# Patient Record
Sex: MALE | Race: WHITE | Employment: FULL TIME | URBAN - METROPOLITAN AREA
[De-identification: names, ages, dates, MRNs, and addresses within clinical notes are randomized per-mention and may not be internally consistent; named-entity substitution may affect disease eponyms.]

---

## 2017-04-05 ENCOUNTER — OFFICE VISIT (OUTPATIENT)
Dept: FAMILY MEDICINE | Facility: CLINIC | Age: 32
End: 2017-04-05
Payer: COMMERCIAL

## 2017-04-05 VITALS
BODY MASS INDEX: 29.6 KG/M2 | HEART RATE: 68 BPM | HEIGHT: 70 IN | DIASTOLIC BLOOD PRESSURE: 82 MMHG | OXYGEN SATURATION: 98 % | TEMPERATURE: 98.1 F | WEIGHT: 206.8 LBS | SYSTOLIC BLOOD PRESSURE: 133 MMHG

## 2017-04-05 DIAGNOSIS — Z11.3 SCREENING EXAMINATION FOR VENEREAL DISEASE: Primary | ICD-10-CM

## 2017-04-05 DIAGNOSIS — K13.70 ABNORMAL MOUTH FINDING: ICD-10-CM

## 2017-04-05 DIAGNOSIS — G47.9 SLEEP DISTURBANCE: ICD-10-CM

## 2017-04-05 DIAGNOSIS — R06.83 SNORING: ICD-10-CM

## 2017-04-05 PROCEDURE — 87389 HIV-1 AG W/HIV-1&-2 AB AG IA: CPT | Performed by: PHYSICIAN ASSISTANT

## 2017-04-05 PROCEDURE — 86780 TREPONEMA PALLIDUM: CPT | Performed by: PHYSICIAN ASSISTANT

## 2017-04-05 PROCEDURE — 36415 COLL VENOUS BLD VENIPUNCTURE: CPT | Performed by: PHYSICIAN ASSISTANT

## 2017-04-05 PROCEDURE — 87491 CHLMYD TRACH DNA AMP PROBE: CPT | Performed by: PHYSICIAN ASSISTANT

## 2017-04-05 PROCEDURE — 86803 HEPATITIS C AB TEST: CPT | Performed by: PHYSICIAN ASSISTANT

## 2017-04-05 PROCEDURE — 99214 OFFICE O/P EST MOD 30 MIN: CPT | Performed by: PHYSICIAN ASSISTANT

## 2017-04-05 PROCEDURE — 87591 N.GONORRHOEAE DNA AMP PROB: CPT | Performed by: PHYSICIAN ASSISTANT

## 2017-04-05 ASSESSMENT — ANXIETY QUESTIONNAIRES
GAD7 TOTAL SCORE: 3
3. WORRYING TOO MUCH ABOUT DIFFERENT THINGS: SEVERAL DAYS
5. BEING SO RESTLESS THAT IT IS HARD TO SIT STILL: NOT AT ALL
7. FEELING AFRAID AS IF SOMETHING AWFUL MIGHT HAPPEN: NOT AT ALL
1. FEELING NERVOUS, ANXIOUS, OR ON EDGE: SEVERAL DAYS
IF YOU CHECKED OFF ANY PROBLEMS ON THIS QUESTIONNAIRE, HOW DIFFICULT HAVE THESE PROBLEMS MADE IT FOR YOU TO DO YOUR WORK, TAKE CARE OF THINGS AT HOME, OR GET ALONG WITH OTHER PEOPLE: SOMEWHAT DIFFICULT
6. BECOMING EASILY ANNOYED OR IRRITABLE: NOT AT ALL
2. NOT BEING ABLE TO STOP OR CONTROL WORRYING: SEVERAL DAYS

## 2017-04-05 ASSESSMENT — PATIENT HEALTH QUESTIONNAIRE - PHQ9: 5. POOR APPETITE OR OVEREATING: NOT AT ALL

## 2017-04-05 NOTE — PROGRESS NOTES
SUBJECTIVE:                                                    Aydin Jang is a 31 year old male who presents to clinic today for the following health issues:      Wants to be checked for STD's  Insomnia      Duration: 1 year plus    Description  Frequency of insomnia:  several times a week  Time to fall asleep: 10 minutes  Middle of night awakening:  occasionally  Early morning awakening:  Yes for work    Accompanying signs and symptoms:  snoring and tosses and turns    History  Similar episodes in past:  YES  Previous evaluation/sleep study:  no     Precipitating or alleviating factors:  New stressful situation: YES  Caffeine intake after lunchtime: no   OTC decongestants: no   Any new medications: no     Therapies tried and outcome: none     Depression or Anxiety - New Diagnosis   Duration of complaint: 5 months     Feels like his mood is because of his lack of sleep. He notes that he is likely over extending himself. He is staying up a lot later than he likely should.   Drinks coffee daily, 4 cups on average. He has been trying to restrict this to the weekends.   Has to get up at 4:30am-5am.   Recently going to bed 11pm-12am. He feels more awake when he goes to bed. Not very long to fall asleep but does ruminate over things sometimes.   He snores, no witnessed apnea. He does grind his teeth.     Ended up sleeping all day yesterday. Hard to sleep recently.   Finds himself thinking about things.   Has thought about therapy- would be willing to go. He notes that groups make him more anxious.   Has not been doing much exercise. Plans to start doing some running.     Problem list and histories reviewed & adjusted, as indicated.  Additional history: as documented    Labs reviewed in EPIC    Reviewed and updated as needed this visit by clinical staff  Tobacco  Allergies  Meds  Problems  Med Hx  Surg Hx  Fam Hx  Soc Hx        Reviewed and updated as needed this visit by Provider  Allergies  Meds   "Problems         ROS:  Constitutional, HEENT, cardiovascular, pulmonary, gi and gu systems are negative, except as otherwise noted.    OBJECTIVE:                                                    /87 (BP Location: Right arm, Patient Position: Chair, Cuff Size: Adult Regular)  Pulse 64  Temp 98.1  F (36.7  C) (Oral)  Ht 5' 9.5\" (1.765 m)  Wt 206 lb 12.8 oz (93.8 kg)  SpO2 98%  BMI 30.1 kg/m2  Body mass index is 30.1 kg/(m^2).  GENERAL: healthy, alert and no distress  PSYCH: mentation appears normal, affect normal/bright    Diagnostic Test Results:  none      ASSESSMENT/PLAN:                                                    1. Screening examination for venereal disease  - Hepatitis C antibody  - HIV Antigen Antibody Combo  - Anti Treponema  - Chlamydia trachomatis PCR  - Neisseria gonorrhoeae PCR    2. Snoring  Concerned about this causing some of his sleep problems. Referral.   - SLEEP EVALUATION & MANAGEMENT REFERRAL - ADULT; Future    3. Sleep disturbance  Discussed sleep hygiene and adjusting his schedule. Suspect counseling to work on some of the ruminating might be helpful.   - SLEEP EVALUATION & MANAGEMENT REFERRAL - ADULT; Future  - MENTAL HEALTH REFERRAL    4. Abnormal mouth finding  Patient is concerned about a possible opening from the soft palate to the nasal cavity and a sound that happens. Nothing seen on exam. Referral as needed.   - OTOLARYNGOLOGY REFERRAL    See Patient Instructions    Belia Pederson PA-C  [facility]  >50% of the visit spent in counseling and coordination of care. Visit length 25 minutes.    "

## 2017-04-05 NOTE — MR AVS SNAPSHOT
After Visit Summary   4/5/2017    Aydin Jang    MRN: [id number]           Patient Information     Date Of Birth          1985        Visit Information        Provider Department      4/5/2017 2:20 PM Belia Pederson PA-C [facility]        Today's Diagnoses     Screening examination for venereal disease    -  1    Snoring        Sleep disturbance        Abnormal mouth finding           Follow-ups after your visit        Additional Services     MENTAL HEALTH REFERRAL       Your provider has referred you to: [facility]: [facility] - Counseling (Individual/Couples/Family) - [facility] [phone]   [email or web address]   *Patient will be contacted by [facility]'s scheduling partner, [facility]), to schedule an appointment.  Patients may also call [facility] to schedule.    All scheduling is subject to the client's specific insurance plan & benefits, provider/location availability, and provider clinical specialities.  Please arrive 15 minutes early for your first appointment and bring your completed paperwork.    Please be aware that coverage of these services is subject to the terms and limitations of your health insurance plan.  Call member services at your health plan with any benefit or coverage questions.            OTOLARYNGOLOGY REFERRAL       Your provider has referred you to: [facility]: [facility] Jason Bonner [phone]   [email or web address]    Please be aware that coverage of these services is subject to the terms and limitations of your health insurance plan.  Call member services at your health plan with any benefit or coverage questions.      Please bring the following with you to your appointment:    (1) Any X-Rays, CTs or MRIs which have been performed.  Contact the facility where they were done to arrange for   prior to your scheduled appointment.   (2) List of current medications  (3) This referral request   (4) Any documents/labs given to you for this referral            SLEEP EVALUATION & MANAGEMENT REFERRAL - ADULT       Please be aware that coverage of these services is subject to the terms and limitations of your health insurance plan.  Call member services at your health plan with any benefit or coverage questions.      Please bring the following to your appointment:    >>   List of current medications   >>   This referral request   >>   Any documents/labs given to you for this referral    [facility] [phone] (Age 15 and up)                  Future tests that were ordered for you today     Open Future Orders        Priority Expected Expires Ordered    SLEEP EVALUATION & MANAGEMENT REFERRAL - ADULT Routine  4/5/2018 4/5/2017            Who to contact     If you have questions or need follow up information about today's clinic visit or your schedule please contact [facility] directly at [phone].  Normal or non-critical lab and imaging results will be communicated to you by MyChart, letter or phone within 4 business days after the clinic has received the results. If you do not hear from us within 7 days, please contact the clinic through [employer] or phone. If you have a critical or abnormal lab result, we will notify you by phone as soon as possible.  Submit refill requests through [employer] or call your pharmacy and they will forward the refill request to us. Please allow 3 business days for your refill to be completed.          Additional Information About Your Visit        [employer] Information     [employer] gives you secure access to your electronic health record. If you see a primary care provider, you can also send messages to your care team and make appointments. If you have questions, please call your primary care clinic.  If you do not have a primary care  "provider, please call [phone] and they will assist you.        Care EveryWhere ID     This is your Care EveryWhere ID. This could be used by other organizations to access your [facility] medical records  [id number]        Your Vitals Were     Pulse Temperature Height Pulse Oximetry BMI (Body Mass Index)       64 98.1  F (36.7  C) (Oral) 5' 9.5\" (1.765 m) 98% 30.1 kg/m2        Blood Pressure from Last 3 Encounters:   04/05/17 146/87   11/21/16 136/84   07/14/16 135/88    Weight from Last 3 Encounters:   04/05/17 206 lb 12.8 oz (93.8 kg)   11/21/16 207 lb (93.9 kg)   07/14/16 214 lb 12.8 oz (97.4 kg)              We Performed the Following     Anti Treponema     Chlamydia trachomatis PCR     Hepatitis C antibody     HIV Antigen Antibody Combo     MENTAL HEALTH REFERRAL     Neisseria gonorrhoeae PCR     OTOLARYNGOLOGY REFERRAL        Primary Care Provider    None Specified       No primary provider on file.        Thank you!     Thank you for choosing [facility]  for your care. Our goal is always to provide you with excellent care. Hearing back from our patients is one way we can continue to improve our services. Please take a few minutes to complete the written survey that you may receive in the mail after your visit with us. Thank you!             Your Updated Medication List - Protect others around you: Learn how to safely use, store and throw away your medicines at www.disposemymeds.org.          This list is accurate as of: 4/5/17  2:49 PM.  Always use your most recent med list.                   Brand Name Dispense Instructions for use    ascorbic acid 250 MG Chew chewable tablet    vitamin C     Take 250 mg by mouth as needed (3 times a week)         "

## 2017-04-05 NOTE — NURSING NOTE
"Chief Complaint   Patient presents with     STD     STD testing      Sleep Problem     wants a referral for a sleep study     Anxiety     wants a referral for a mental health provider        Initial /87 (BP Location: Right arm, Patient Position: Chair, Cuff Size: Adult Regular)  Pulse 64  Temp 98.1  F (36.7  C) (Oral)  Ht 5' 9.5\" (1.765 m)  Wt 206 lb 12.8 oz (93.8 kg)  SpO2 98%  BMI 30.1 kg/m2 Estimated body mass index is 30.1 kg/(m^2) as calculated from the following:    Height as of this encounter: 5' 9.5\" (1.765 m).    Weight as of this encounter: 206 lb 12.8 oz (93.8 kg).  Medication Reconciliation: complete   Renee Andersen ma      "

## 2017-04-06 LAB
C TRACH DNA SPEC QL NAA+PROBE: NORMAL
HCV AB SERPL QL IA: NORMAL
HIV 1+2 AB+HIV1 P24 AG SERPL QL IA: NORMAL
N GONORRHOEA DNA SPEC QL NAA+PROBE: NORMAL
SPECIMEN SOURCE: NORMAL
SPECIMEN SOURCE: NORMAL
T PALLIDUM IGG+IGM SER QL: NEGATIVE

## 2017-04-06 ASSESSMENT — PATIENT HEALTH QUESTIONNAIRE - PHQ9: SUM OF ALL RESPONSES TO PHQ QUESTIONS 1-9: 4

## 2017-04-06 ASSESSMENT — ANXIETY QUESTIONNAIRES: GAD7 TOTAL SCORE: 3

## 2017-04-19 ENCOUNTER — OFFICE VISIT (OUTPATIENT)
Dept: SLEEP MEDICINE | Facility: CLINIC | Age: 32
End: 2017-04-19
Attending: PHYSICIAN ASSISTANT
Payer: COMMERCIAL

## 2017-04-19 VITALS
BODY MASS INDEX: 29.63 KG/M2 | SYSTOLIC BLOOD PRESSURE: 131 MMHG | WEIGHT: 207 LBS | HEIGHT: 70 IN | OXYGEN SATURATION: 100 % | DIASTOLIC BLOOD PRESSURE: 86 MMHG | HEART RATE: 58 BPM

## 2017-04-19 DIAGNOSIS — G47.10 EXCESSIVE SLEEPINESS: ICD-10-CM

## 2017-04-19 DIAGNOSIS — F51.12 BEHAVIORALLY INDUCED INSUFFICIENT SLEEP SYNDROME: Primary | ICD-10-CM

## 2017-04-19 DIAGNOSIS — R06.83 SNORING: ICD-10-CM

## 2017-04-19 DIAGNOSIS — G47.9 SLEEP DISTURBANCE: ICD-10-CM

## 2017-04-19 PROCEDURE — 99244 OFF/OP CNSLTJ NEW/EST MOD 40: CPT | Performed by: INTERNAL MEDICINE

## 2017-04-19 NOTE — MR AVS SNAPSHOT
"              After Visit Summary   4/19/2017    Aydin Jang    MRN: [id number]           Patient Information     Date Of Birth          1985        Visit Information        Provider Department      4/19/2017 1:00 PM Parker Baird MD [facility]        Today's Diagnoses     Behaviorally induced insufficient sleep syndrome    -  1    Snoring        Sleep disturbance        Excessive sleepiness          Care Instructions    MY TREATMENT INFORMATION FOR SLEEP APNEA-  Aydin Jang    DOCTOR : Parker Baird  SLEEP CENTER :      MY CONTACT NUMBER:       If I haven't had a sleep study yet, what can I expect?  A personal story from [employer]  https://www.uConnect.com/watch?v=AxPLmlRpnCs    Am I having a home sleep study?  Here is a video in case you get home and want to make sure you have done it correctly  https://www.uConnect.com/watch?v=GER7P8iIss0&feature=youtu.be    Frequently asked questions:  1. What is Obstructive Sleep Apnea (RACHNA)? RACHNA is the most common type of sleep apnea. Apnea literally means, \"without breath.\" It is characterized by repetitive pauses in breathing, despite continued effort to breathe, and is usually associated with a reduction in blood oxygen saturation. Apneas can last 10 to over 60 seconds. It is caused by narrowing or collapse of the upper airway as muscles relax during sleep. Severity of sleep apnea is determined by frequency of breathing events and their effect on your sleep and oxygen levels determined during sleep testing.   2. What are the consequences of RACHNA? Symptoms include: daytime sleepiness- possibly increasing the risk of falling asleep while driving, unrefreshing/restless sleep, snoring, insomnia, waking frequently to urinate, waking with heartburn or reflux, reduced concentration and memory, and morning headaches. Other health consequences may include development of high blood pressure and other cardiovascular disease in persons who " are susceptible. Untreated RACHNA  can contribute to heart disease, stroke and diabetes.   3. What are the treatment options? In most situations, sleep apnea is a lifelong disease that must be managed with daily therapy. Medications are not effective for sleep apnea and surgery is generally not performed until other therapies have been tried. Therapy is usually tailored to the individual patient based on many factors including your wishes as well as severity of sleep apnea and severity of obesity. Continuous Positive Airway (CPAP) is the most reliable treatment. An oral device to hold your jaw forward is usually the next most reliable option. Other options include postioning devices (to keep you off your back), weight loss, and surgery including a tongue pacing device. There is more detail about some of these options below.            1. CPAP-  WHAT DOES IT DO AND HOW CAN I LEARN TO WEAR IT?                               BEFORE I START, CAN I WATCH A MOVIE TO GET A PLAN ON HOW TO USE CPAP?  https://www.[employer].com/watch?c=m6P47ev404Q      Continuous positive airway pressure, or CPAP, is the most effective treatment for obstructive sleep apnea. It works by blowing room air, through a mask, to hold your throat open. A decision to use CPAP is a major step forward in the pursuit of a healthier life. The successful use of CPAP will help you breathe easier, sleep better and live healthier. You can choose CPAP equipment from any durable medical equipment provider that meets your needs.  Using CPAP can be a positive experience if you keep these horn points in mind:  1. Commitment  CPAP is not a quick fix for your problem. It involves a long-term commitment to improve your sleep and your health.    2. Communication  Stay in close communication with both your sleep doctor and your CPAP supplier. Ask lots of questions and seek help when you need it.    3. Consistency  Use CPAP all night, every night and for every nap. You will  "receive the maximum health benefits from CPAP when you use it every time that you sleep. This will also make it easier for your body to adjust to the treatment.    4. Correction  The first machine and mask that you try may not be the best ones for you. Work with your sleep doctor and your CPAP supplier to make corrections to your equipment selection. Ask about trying a different type of machine or mask if you have ongoing problems. Make sure that your mask is a good fit and learn to use your equipment properly.    5. Challenge  Tell a family member or close friend to ask you each morning if you used your CPAP the previous night. Have someone to challenge you to give it your best effort.    6. Connection   Your adjustment to CPAP will be easier if you are able to connect with others who use the same treatment. Ask your sleep doctor if there is a support group in your area for people who have sleep apnea, or look for one on the Internet.  7. Comfort   Increase your level of comfort by using a saline spray, decongestant or heated humidifier if CPAP irritates your nose, mouth or throat. Use your unit's \"ramp\" setting to slowly get used to the air pressure level. There may be soft pads you can buy that will fit over your mask straps. Look on www.CPAP.com for accessories that can help make CPAP use more comfortable.  8. Cleaning   Clean your mask, tubing and headgear on a regular basis. Put this time in your schedule so that you don't forget to do it. Check and replace the filters for your CPAP unit and humidifier.    9. Completion   Although you are never finished with CPAP therapy, you should reward yourself by celebrating the completion of your first month of treatment. Expect this first month to be your hardest period of adjustment. It will involve some trial and error as you find the machine, mask and pressure settings that are right for you.    10. Continuation  After your first month of treatment, continue to make " a daily commitment to use your CPAP all night, every night and for every nap.    CPAP-Tips to starting with success:  Begin using your CPAP for short periods of time during the day while you watch TV or read.    Use CPAP every night and for every nap. Using it less often reduces the health benefits and makes it harder for your body to get used to it.    Make small adjustments to your mask, tubing, straps and headgear until you get the right fit. Tightening the mask may actually worsen the leak.  If it leaves significant marks on your face or irritates the bridge of your nose, it may not be the best mask for you.  Speak with the person who supplied the mask and consider trying other masks. Insurances will allow you to try different masks during the first month of starting CPAP.  Insurance also covers a new mask, hose and filter about every 6 months.    Use a saline nasal spray to ease mild nasal congestion. Neti-Pot or saline nasal rinses may also help. Nasal gel sprays can help reduce nasal dryness.  Biotene mouthwash can be helpful to protect your teeth if you experience frequent dry mouth.  Dry mouth may be a sign of air escaping out of your mouth or out of the mask in the case of a full face mask.  Speak with your provider if you expect that is the case.     Take a nasal decongestant to relieve more severe nasal or sinus congestion.  Do not use Afrin (oxymetazoline) nasal spray more than 3 days in a row.  Speak with your sleep doctor if your nasal congestion is chronic.    Use a heated humidifier that fits your CPAP model to enhance your breathing comfort. Adjust the heat setting up if you get a dry nose or throat, down if you get condensation in the hose or mask.  Position the CPAP lower than you so that any condensation in the hose drains back into the machine rather than towards the mask.    Try a system that uses nasal pillows if traditional masks give you problems.    Clean your mask, tubing and headgear  once a week. Make sure the equipment dries fully.    Regularly check and replace the filters for your CPAP unit and humidifier.    Work closely with your sleep provider and your CPAP supplier to make sure that you have the machine, mask and air pressure setting that works best for you. It is better to stop using it and call your provider to solve problems than to lay awake all night frustrated with the device.    BESIDES CPAP, WHAT OTHER THERAPIES ARE THERE?      Positioning Device  Positioning devices are generally used when sleep apnea is mild and only occurs on your back.This example shows a pillow that straps around the waist. It may be appropriate for those whose sleep study shows milder sleep apnea that occurs primarily when lying flat on one's back. Preliminary studies have shown benefit but effectiveness at home may need to be verified by a home sleep test. These devices are generally not covered by medical insurance.                      Oral Appliance  What is oral appliance therapy?  An oral appliance is a small acrylic device that fits over the upper and lower teeth or tongue (similar to an orthodontic retainer or a mouth guard). This device slightly advances the lower jaw or tongue, which moves the base of the tongue forward, opens the airway, improves breathing and can effectively treat snoring and obstructive sleep apnea sleep apnea. The appliance is fabricated and customized by a qualified dentist with experience in treating snoring and sleep apnea. Oral appliances are usually well tolerated and have relatively high compliance by patients1, 2, 3.  When is an oral appliance indicated?  Oral appliance therapy is recommended as a first-line treatment for patients with primary snoring, mild sleep apnea, and for patients with moderate sleep apnea who prefer appliance therapy to use of CPAP4, 5. Severity of sleep apnea is determined by sleep testing and is based on the number of respiratory events per hour  of sleep.   How successful is oral appliance therapy?  The success rate of oral appliance therapy in patients with mild sleep apnea is 75-80% while in patients with moderate sleep apnea it is 50-70%. The chance of success in patients with severe sleep apnea is 40-50%. The research also shows that oral appliances have a beneficial effect on the cardiovascular health of RACHNA patients at the same magnitude as CPAP therapy7.  Oral appliances should be a second-line treatment in cases of severe sleep apnea, but if not completely successful then a combination therapy utilizing CPAP plus oral appliance therapy may be effective. Oral appliances tend to be effective in a broad range of patients although studies show that the patients who have the highest success are females, younger patients, those with milder disease, and less severe obesity. 3, 6.   The chances of success are lower in patients who have more severe RACHNA, are older, and those who are morbidly obese.     Example of an oral appliance   Finding a dentist that practices dental sleep medicine  Specific training is available through the American Academy of Dental Sleep Medicine for dentists interested in working in the field of sleep. To find a dentist who is educated in the field of sleep and the use of oral appliances, near you, visit the Web site of the American Academy of Dental Sleep Medicine; also see   http://www.accpstorage.org/newOrganization/patients/oralAppliances.pdf  To search for a dentist certified in these practices:  Http://aadsm.org/FindADentist.aspx?1  1. Kaylin, et al. Objectively measured vs self-reported compliance during oral appliance therapy for sleep-disordered breathing. Chest 2013; 144(5): 3869-8750.  2. Odalys et al. Objective measurement of compliance during oral appliance therapy for sleep-disordered breathing. Thorax 2013; 68(1): 91-96.  3. Farhat et al. Mandibular advancement devices in 620 men and women with RACHNA and  snoring: tolerability and predictors of treatment success. Chest 2004; 125: 4542-0243.  4. Peter et al. Oral appliances for snoring and RACHNA: a review. Sleep 2006; 29: 244-262.  5. Mingo et al. Oral appliance treatment for RACHNA: an update. J Clin Sleep Med 2014; 10(2): 215-227.  6. Shen et al. Predictors of OSAH treatment outcome. J Dent Res 2007; 86: 6005-5834.      Weight Loss:    Weight management is a personal decision.  If you are interested in exploring weight loss strategies, the following discussion covers the impact on weight loss on sleep apnea and the approaches that may be successful.    Weight loss decreases severity of sleep apnea in most people with obesity. For those with mild obesity who have developed snoring with weight gain, even 15-30 pound weight loss can improve and occasionally eliminate sleep apnea.  Structured and life-long dietary and health habits are necessary to lose weight and keep healthier weight levels.     Though there may be significant health benefits from weight loss, long-term weight loss is very difficult to achieve- studies show success with dietary management in less than 10% of people. In addition, substantial weight loss may require years of dietary control and may be difficult if patients have severe obesity. In these cases, surgical management may be considered.  Finally, older individuals who have tolerated obesity without health complications may be less likely to benefit from weight loss strategies.    Your BMI is Body mass index is 30.13 kg/(m^2).  Body mass index (BMI) is one way to tell whether you are at a healthy weight, overweight, or obese. It measures your weight in relation to your height.  A BMI of 18.5 to 24.9 is in the healthy range. A person with a BMI of 25 to 29.9 is considered overweight, and someone with a BMI of 30 or greater is considered obese. More than two-thirds of American adults are considered overweight or obese.  Being overweight  or obese increases the risk for further weight gain. Excess weight may lead to heart disease and diabetes.  Creating and following plans for healthy eating and physical activity may help you improve your health.  Weight control is part of healthy lifestyle and includes exercise, emotional health, and healthy eating habits. Careful eating habits lifelong are the mainstay of weight control. Though there are significant health benefits from weight loss, long-term weight loss with diet alone may be very difficult to achieve- studies show long-term success with dietary management in less than 10% of people. Attaining a healthy weight may be especially difficult to achieve in those with severe obesity. In some cases, medications, devices and surgical management might be considered.  What can you do?  If you are overweight or obese and are interested in methods for weight loss, you should discuss this with your provider.     Consider reducing daily calorie intake by 500 calories.     Keep a food journal.     Avoiding skipping meals, consider cutting portions instead.    Diet combined with exercise helps maintain muscle while optimizing fat loss. Strength training is particularly important for building and maintaining muscle mass. Exercise helps reduce stress, increase energy, and improves fitness. Increasing exercise without diet control, however, may not burn enough calories to loose weight.       Start walking three days a week 10-20 minutes at a time    Work towards walking thirty minutes five days a week     Eventually, increase the speed of your walking for 1-2 minutes at time    In addition, we recommend that you review healthy lifestyles and methods for weight loss available through the National Institutes of Health patient information sites:  http://win.niddk.nih.gov/publications/index.htm    And look into health and wellness programs that may be available through your health insurance provider, employer, local  [facility], or [employer].    Weight management plan: Patient was referred to their PCP to discuss a diet and exercise plan.    Surgery:    Upper Airway Surgery for RACHNA  Surgery for RACHNA is a second-line treatment option in the management of sleep apnea.  Surgery should be considered for patients who are having a difficult time tolerating CPAP.    Surgery for RACHNA is directed at areas that are responsible for narrowing or complete obstruction of the airway during sleep.  There are a wide range of procedures available to enlarge and/or stabilize the airway to prevent blockage of breathing in the three major areas where it can occur: the palate, tongue, and nasal regions.  Successful surgical treatment depends on the accurate identification of the factors responsible for obstructive sleep apnea in each person.  A personalized approach is required because there is no single treatment that works well for everyone.  Because of anatomic variation, consultation with an examination by a sleep surgeon is a critical first step in determining what surgical options are best for each patient.  In some cases, examination during sedation may be recommended in order to guide the selection of procedures.  Patients will be counseled about risks and benefits as well as the typical recovery course after surgery. Surgery is typically not a cure for a person s RACHNA.  However, surgery will often significantly improve one s RACHNA severity (termed  success rate ).  Even in the absence of a cure, surgery will decrease the cardiovascular risk associated with OSA7; improve overall quality of life8 (sleepiness, functionality, sleep quality, etc).          Palate Procedures:  Patients with RACHNA often have narrowing of their airway in the region of their tonsils and uvula.  The goals of palate procedures are to widen the airway in this region as well as to help the tissues resist collapse.  Modern palate procedure techniques focus on tissue  conservation and soft tissue rearrangement, rather than tissue removal.  Often the uvula is preserved in this procedure. Residual sleep apnea is common in patient after pharyngoplasty with an average reduction in sleep apnea events of 33%2.      Tongue Procedures:  While patients are awake, the muscles that surround the throat are active and keep this region open for breathing. These muscles relax during sleep, allowing the tongue and other structures to collapse and block breathing.  There are several different tongue procedures available.  Selection of a tongue base procedure depends on characteristics seen on physical exam.  Generally, procedures are aimed at removing bulky tissues in this area or preventing the back of the tongue from falling back during sleep.  Success rates for tongue surgery range from 50-62%3.    Hypoglossal Nerve Stimulation:  Hypoglossal nerve stimulation has recently received approval from the [place] Food and Drug Administration for the treatment of obstructive sleep apnea.  This is based on research showing that the system was safe and effective in treating sleep apnea6.  Results showed that the median AHI score decreased 68%, from 29.3 to 9.0. This therapy uses an implant system that senses breathing patterns and delivers mild stimulation to airway muscles, which keeps the airway open during sleep.  The system consists of three fully implanted components: a small generator (similar in size to a pacemaker), a breathing sensor, and a stimulation lead.  Using a small handheld remote, a patient turns the therapy on before bed and off upon awakening.    Candidates for this device must be greater than 22 years of age, have moderate to severe RACHNA (AHI between 20-65), BMI less than 32, have tried CPAP/oral appliance without success, and have appropriate upper airway anatomy (determined by a sleep endoscopy performed by Dr. Wood).    Hypoglossal Nerve Stimulation Pathway:    The sleep  surgeon s office will work with the patient through the insurance prior-authorization process (including communications and appeals).    Nasal Procedures:  Nasal obstruction can interfere with nasal breathing during the day and night.  Studies have shown that relief of nasal obstruction can improve the ability of some patients to tolerate positive airway pressure therapy for obstructive sleep apnea1.  Treatment options include medications such as nasal saline, topical corticosteroid and antihistamine sprays, and oral medications such as antihistamines or decongestants. Non-surgical treatments can include external nasal dilators for selected patients. If these are not successful by themselves, surgery can improve the nasal airway either alone or in combination with these other options.      Combination Procedures:  Combination of surgical procedures and other treatments may be recommended, particularly if patients have more than one area of narrowing or persistent positional disease.  The success rate of combination surgery ranges from 66-80%2,3.      1. Jasmyn VEGA. The Role of the Nose in Snoring and Obstructive Sleep Apnoea: An Update.  Eur Arch Otorhinolaryngol. 2011; 268: 1365-73.  2.  Carly SM; Yaron JA; Kaila JR; Pallanch JF; Hansa MB; Johnathan SG; Bibi QUINTERO. Surgical modifications of the upper airway for obstructive sleep apnea in adults: a systematic review and meta-analysis. SLEEP 2010;33(10):3585-1309. Juan David WICK. Hypopharyngeal surgery in obstructive sleep apnea: an evidence-based medicine review.  Arch Otolaryngol Head Neck Surg. 2006 Feb;132(2):206-13.  3. Kamlesh YH1, Salima Y, Emir DESTINY. The efficacy of anatomically based multilevel surgery for obstructive sleep apnea. Otolaryngol Head Neck Surg. 2003 Oct;129(4):327-35.  4. Juan David WICK, Goldberg A. Hypopharyngeal Surgery in Obstructive Sleep Apnea: An Evidence-Based Medicine Review. Arch Otolaryngol Head Neck Surg. 2006 Feb;132(2):206-13.  5. Sahil CHATTERJEE  et al. Upper-Airway Stimulation for Obstructive Sleep Apnea.  N Engl J Med. 2014 Jan 9;370(2):139-49.  6. Jose Luis Y et al. Increased Incidence of Cardiovascular Disease in Middle-aged Men with Obstructive Sleep Apnea. Am J Respir Crit Care Med; 2002 166: 159-165  7. Nav MOSS et al. Studying Life Effects and Effectiveness of Palatopharyngoplasty (SLEEP) study: Subjective Outcomes of Isolated Uvulopalatopharyngoplasty. Otolaryngol Head Neck Surg. 2011; 144: 623-631.                      Follow-ups after your visit        Your next 10 appointments already scheduled     May 09, 2017  2:00 PM CDT   New Visit with David Geller [facility]    [facility]  [address]  [facility] [id number] [phone]              Future tests that were ordered for you today     Open Future Orders        Priority Expected Expires Ordered    Comprehensive Sleep Study Routine  10/16/2017 4/19/2017            Who to contact     If you have questions or need follow up information about today's clinic visit or your schedule please contact [facility] directly at [phone].  Normal or non-critical lab and imaging results will be communicated to you by MyChart, letter or phone within 4 business days after the clinic has received the results. If you do not hear from us within 7 days, please contact the clinic through [employer] or phone. If you have a critical or abnormal lab result, we will notify you by phone as soon as possible.  Submit refill requests through "[employer] or call your pharmacy and they will forward the refill request to us. Please allow 3 business days for your refill to be completed.          Additional Information About Your Visit        "[employer] Information     "[employer] gives you secure access to your electronic health record. If you see a primary care provider, you can also send messages to your care team and make appointments. If you  "have questions, please call your primary care clinic.  If you do not have a primary care provider, please call [phone] and they will assist you.        Care EveryWhere ID     This is your Care EveryWhere ID. This could be used by other organizations to access your [facility] medical records  [id number]        Your Vitals Were     Pulse Height Pulse Oximetry BMI (Body Mass Index)          58 1.765 m (5' 9.5\") 100% 30.13 kg/m2         Blood Pressure from Last 3 Encounters:   04/19/17 131/86   04/05/17 133/82   11/21/16 136/84    Weight from Last 3 Encounters:   04/19/17 93.9 kg (207 lb)   04/05/17 93.8 kg (206 lb 12.8 oz)   11/21/16 93.9 kg (207 lb)              We Performed the Following     SLEEP EVALUATION & MANAGEMENT REFERRAL - ADULT        Primary Care Provider Office Phone #    [facility] [phone]       [address] [place] [id number]        Thank you!     Thank you for choosing [facility]  for your care. Our goal is always to provide you with excellent care. Hearing back from our patients is one way we can continue to improve our services. Please take a few minutes to complete the written survey that you may receive in the mail after your visit with us. Thank you!             Your Updated Medication List - Protect others around you: Learn how to safely use, store and throw away your medicines at www.disposemymeds.org.          This list is accurate as of: 4/19/17  2:11 PM.  Always use your most recent med list.                   Brand Name Dispense Instructions for use    ascorbic acid 250 MG Chew chewable tablet    vitamin C     Take 250 mg by mouth as needed (3 times a week)         "

## 2017-04-19 NOTE — PATIENT INSTRUCTIONS
"MY TREATMENT INFORMATION FOR SLEEP APNEA-  Aydin Jang    DOCTOR : Parker Baird  SLEEP CENTER :      MY CONTACT NUMBER:       If I haven't had a sleep study yet, what can I expect?  A personal story from [place]  https://www.[employer].com/watch?v=AxPLmlRpnCs    Am I having a home sleep study?  Here is a video in case you get home and want to make sure you have done it correctly  https://www.[employer].com/watch?v=KOB2P9gRjd7&feature=youtu.be    Frequently asked questions:  1. What is Obstructive Sleep Apnea (RACHNA)? RACHNA is the most common type of sleep apnea. Apnea literally means, \"without breath.\" It is characterized by repetitive pauses in breathing, despite continued effort to breathe, and is usually associated with a reduction in blood oxygen saturation. Apneas can last 10 to over 60 seconds. It is caused by narrowing or collapse of the upper airway as muscles relax during sleep. Severity of sleep apnea is determined by frequency of breathing events and their effect on your sleep and oxygen levels determined during sleep testing.   2. What are the consequences of RACHNA? Symptoms include: daytime sleepiness- possibly increasing the risk of falling asleep while driving, unrefreshing/restless sleep, snoring, insomnia, waking frequently to urinate, waking with heartburn or reflux, reduced concentration and memory, and morning headaches. Other health consequences may include development of high blood pressure and other cardiovascular disease in persons who are susceptible. Untreated RACHNA  can contribute to heart disease, stroke and diabetes.   3. What are the treatment options? In most situations, sleep apnea is a lifelong disease that must be managed with daily therapy. Medications are not effective for sleep apnea and surgery is generally not performed until other therapies have been tried. Therapy is usually tailored to the individual patient based on many factors including your wishes as well as severity of sleep " apnea and severity of obesity. Continuous Positive Airway (CPAP) is the most reliable treatment. An oral device to hold your jaw forward is usually the next most reliable option. Other options include postioning devices (to keep you off your back), weight loss, and surgery including a tongue pacing device. There is more detail about some of these options below.            1. CPAP-  WHAT DOES IT DO AND HOW CAN I LEARN TO WEAR IT?                               BEFORE I START, CAN I WATCH A MOVIE TO GET A PLAN ON HOW TO USE CPAP?  https://www.[employer].com/watch?o=p2T46ax605C      Continuous positive airway pressure, or CPAP, is the most effective treatment for obstructive sleep apnea. It works by blowing room air, through a mask, to hold your throat open. A decision to use CPAP is a major step forward in the pursuit of a healthier life. The successful use of CPAP will help you breathe easier, sleep better and live healthier. You can choose CPAP equipment from any durable medical equipment provider that meets your needs.  Using CPAP can be a positive experience if you keep these horn points in mind:  1. Commitment  CPAP is not a quick fix for your problem. It involves a long-term commitment to improve your sleep and your health.    2. Communication  Stay in close communication with both your sleep doctor and your CPAP supplier. Ask lots of questions and seek help when you need it.    3. Consistency  Use CPAP all night, every night and for every nap. You will receive the maximum health benefits from CPAP when you use it every time that you sleep. This will also make it easier for your body to adjust to the treatment.    4. Correction  The first machine and mask that you try may not be the best ones for you. Work with your sleep doctor and your CPAP supplier to make corrections to your equipment selection. Ask about trying a different type of machine or mask if you have ongoing problems. Make sure that your mask is a good  "fit and learn to use your equipment properly.    5. Challenge  Tell a family member or close friend to ask you each morning if you used your CPAP the previous night. Have someone to challenge you to give it your best effort.    6. Connection   Your adjustment to CPAP will be easier if you are able to connect with others who use the same treatment. Ask your sleep doctor if there is a support group in your area for people who have sleep apnea, or look for one on the Internet.  7. Comfort   Increase your level of comfort by using a saline spray, decongestant or heated humidifier if CPAP irritates your nose, mouth or throat. Use your unit's \"ramp\" setting to slowly get used to the air pressure level. There may be soft pads you can buy that will fit over your mask straps. Look on www.CPAP.com for accessories that can help make CPAP use more comfortable.  8. Cleaning   Clean your mask, tubing and headgear on a regular basis. Put this time in your schedule so that you don't forget to do it. Check and replace the filters for your CPAP unit and humidifier.    9. Completion   Although you are never finished with CPAP therapy, you should reward yourself by celebrating the completion of your first month of treatment. Expect this first month to be your hardest period of adjustment. It will involve some trial and error as you find the machine, mask and pressure settings that are right for you.    10. Continuation  After your first month of treatment, continue to make a daily commitment to use your CPAP all night, every night and for every nap.    CPAP-Tips to starting with success:  Begin using your CPAP for short periods of time during the day while you watch TV or read.    Use CPAP every night and for every nap. Using it less often reduces the health benefits and makes it harder for your body to get used to it.    Make small adjustments to your mask, tubing, straps and headgear until you get the right fit. Tightening the mask " may actually worsen the leak.  If it leaves significant marks on your face or irritates the bridge of your nose, it may not be the best mask for you.  Speak with the person who supplied the mask and consider trying other masks. Insurances will allow you to try different masks during the first month of starting CPAP.  Insurance also covers a new mask, hose and filter about every 6 months.    Use a saline nasal spray to ease mild nasal congestion. Neti-Pot or saline nasal rinses may also help. Nasal gel sprays can help reduce nasal dryness.  Biotene mouthwash can be helpful to protect your teeth if you experience frequent dry mouth.  Dry mouth may be a sign of air escaping out of your mouth or out of the mask in the case of a full face mask.  Speak with your provider if you expect that is the case.     Take a nasal decongestant to relieve more severe nasal or sinus congestion.  Do not use Afrin (oxymetazoline) nasal spray more than 3 days in a row.  Speak with your sleep doctor if your nasal congestion is chronic.    Use a heated humidifier that fits your CPAP model to enhance your breathing comfort. Adjust the heat setting up if you get a dry nose or throat, down if you get condensation in the hose or mask.  Position the CPAP lower than you so that any condensation in the hose drains back into the machine rather than towards the mask.    Try a system that uses nasal pillows if traditional masks give you problems.    Clean your mask, tubing and headgear once a week. Make sure the equipment dries fully.    Regularly check and replace the filters for your CPAP unit and humidifier.    Work closely with your sleep provider and your CPAP supplier to make sure that you have the machine, mask and air pressure setting that works best for you. It is better to stop using it and call your provider to solve problems than to lay awake all night frustrated with the device.    BESIDES CPAP, WHAT OTHER THERAPIES ARE THERE?       Positioning Device  Positioning devices are generally used when sleep apnea is mild and only occurs on your back.This example shows a pillow that straps around the waist. It may be appropriate for those whose sleep study shows milder sleep apnea that occurs primarily when lying flat on one's back. Preliminary studies have shown benefit but effectiveness at home may need to be verified by a home sleep test. These devices are generally not covered by medical insurance.                      Oral Appliance  What is oral appliance therapy?  An oral appliance is a small acrylic device that fits over the upper and lower teeth or tongue (similar to an orthodontic retainer or a mouth guard). This device slightly advances the lower jaw or tongue, which moves the base of the tongue forward, opens the airway, improves breathing and can effectively treat snoring and obstructive sleep apnea sleep apnea. The appliance is fabricated and customized by a qualified dentist with experience in treating snoring and sleep apnea. Oral appliances are usually well tolerated and have relatively high compliance by patients1, 2, 3.  When is an oral appliance indicated?  Oral appliance therapy is recommended as a first-line treatment for patients with primary snoring, mild sleep apnea, and for patients with moderate sleep apnea who prefer appliance therapy to use of CPAP4, 5. Severity of sleep apnea is determined by sleep testing and is based on the number of respiratory events per hour of sleep.   How successful is oral appliance therapy?  The success rate of oral appliance therapy in patients with mild sleep apnea is 75-80% while in patients with moderate sleep apnea it is 50-70%. The chance of success in patients with severe sleep apnea is 40-50%. The research also shows that oral appliances have a beneficial effect on the cardiovascular health of RACHNA patients at the same magnitude as CPAP therapy7.  Oral appliances should be a second-line  treatment in cases of severe sleep apnea, but if not completely successful then a combination therapy utilizing CPAP plus oral appliance therapy may be effective. Oral appliances tend to be effective in a broad range of patients although studies show that the patients who have the highest success are females, younger patients, those with milder disease, and less severe obesity. 3, 6.   The chances of success are lower in patients who have more severe RACHNA, are older, and those who are morbidly obese.     Example of an oral appliance   Finding a dentist that practices dental sleep medicine  Specific training is available through the American Academy of Dental Sleep Medicine for dentists interested in working in the field of sleep. To find a dentist who is educated in the field of sleep and the use of oral appliances, near you, visit the Web site of the American Academy of Dental Sleep Medicine; also see   http://www.accpstorage.org/newOrganization/patients/oralAppliances.pdf  To search for a dentist certified in these practices:  Http://aadsm.org/FindADentist.aspx?1  1. Kaylin et al. Objectively measured vs self-reported compliance during oral appliance therapy for sleep-disordered breathing. Chest 2013; 144(5): 3316-6838.  2. Odalys, et al. Objective measurement of compliance during oral appliance therapy for sleep-disordered breathing. Thorax 2013; 68(1): 91-96.  3. Farhat et al. Mandibular advancement devices in 620 men and women with RACHNA and snoring: tolerability and predictors of treatment success. Chest 2004; 125: 3698-9524.  4. Green, et al. Oral appliances for snoring and RACHNA: a review. Sleep 2006; 29: 244-262.  5. Mingo, et al. Oral appliance treatment for RACHNA: an update. J Clin Sleep Med 2014; 10(2): 215-227.  6. Shen, et al. Predictors of OSAH treatment outcome. J Dent Res 2007; 86: 5967-5221.      Weight Loss:    Weight management is a personal decision.  If you are interested in  exploring weight loss strategies, the following discussion covers the impact on weight loss on sleep apnea and the approaches that may be successful.    Weight loss decreases severity of sleep apnea in most people with obesity. For those with mild obesity who have developed snoring with weight gain, even 15-30 pound weight loss can improve and occasionally eliminate sleep apnea.  Structured and life-long dietary and health habits are necessary to lose weight and keep healthier weight levels.     Though there may be significant health benefits from weight loss, long-term weight loss is very difficult to achieve- studies show success with dietary management in less than 10% of people. In addition, substantial weight loss may require years of dietary control and may be difficult if patients have severe obesity. In these cases, surgical management may be considered.  Finally, older individuals who have tolerated obesity without health complications may be less likely to benefit from weight loss strategies.    Your BMI is Body mass index is 30.13 kg/(m^2).  Body mass index (BMI) is one way to tell whether you are at a healthy weight, overweight, or obese. It measures your weight in relation to your height.  A BMI of 18.5 to 24.9 is in the healthy range. A person with a BMI of 25 to 29.9 is considered overweight, and someone with a BMI of 30 or greater is considered obese. More than two-thirds of American adults are considered overweight or obese.  Being overweight or obese increases the risk for further weight gain. Excess weight may lead to heart disease and diabetes.  Creating and following plans for healthy eating and physical activity may help you improve your health.  Weight control is part of healthy lifestyle and includes exercise, emotional health, and healthy eating habits. Careful eating habits lifelong are the mainstay of weight control. Though there are significant health benefits from weight loss, long-term  weight loss with diet alone may be very difficult to achieve- studies show long-term success with dietary management in less than 10% of people. Attaining a healthy weight may be especially difficult to achieve in those with severe obesity. In some cases, medications, devices and surgical management might be considered.  What can you do?  If you are overweight or obese and are interested in methods for weight loss, you should discuss this with your provider.     Consider reducing daily calorie intake by 500 calories.     Keep a food journal.     Avoiding skipping meals, consider cutting portions instead.    Diet combined with exercise helps maintain muscle while optimizing fat loss. Strength training is particularly important for building and maintaining muscle mass. Exercise helps reduce stress, increase energy, and improves fitness. Increasing exercise without diet control, however, may not burn enough calories to loose weight.       Start walking three days a week 10-20 minutes at a time    Work towards walking thirty minutes five days a week     Eventually, increase the speed of your walking for 1-2 minutes at time    In addition, we recommend that you review healthy lifestyles and methods for weight loss available through the National Institutes of Health patient information sites:  http://win.niddk.nih.gov/publications/index.htm    And look into health and wellness programs that may be available through your health insurance provider, employer, local community center, or laura club.    Weight management plan: Patient was referred to their PCP to discuss a diet and exercise plan.    Surgery:    Upper Airway Surgery for RACHNA  Surgery for RACHNA is a second-line treatment option in the management of sleep apnea.  Surgery should be considered for patients who are having a difficult time tolerating CPAP.    Surgery for RACHNA is directed at areas that are responsible for narrowing or complete obstruction of the airway  during sleep.  There are a wide range of procedures available to enlarge and/or stabilize the airway to prevent blockage of breathing in the three major areas where it can occur: the palate, tongue, and nasal regions.  Successful surgical treatment depends on the accurate identification of the factors responsible for obstructive sleep apnea in each person.  A personalized approach is required because there is no single treatment that works well for everyone.  Because of anatomic variation, consultation with an examination by a sleep surgeon is a critical first step in determining what surgical options are best for each patient.  In some cases, examination during sedation may be recommended in order to guide the selection of procedures.  Patients will be counseled about risks and benefits as well as the typical recovery course after surgery. Surgery is typically not a cure for a person s RACHNA.  However, surgery will often significantly improve one s RACHNA severity (termed  success rate ).  Even in the absence of a cure, surgery will decrease the cardiovascular risk associated with OSA7; improve overall quality of life8 (sleepiness, functionality, sleep quality, etc).          Palate Procedures:  Patients with RACHNA often have narrowing of their airway in the region of their tonsils and uvula.  The goals of palate procedures are to widen the airway in this region as well as to help the tissues resist collapse.  Modern palate procedure techniques focus on tissue conservation and soft tissue rearrangement, rather than tissue removal.  Often the uvula is preserved in this procedure. Residual sleep apnea is common in patient after pharyngoplasty with an average reduction in sleep apnea events of 33%2.      Tongue Procedures:  While patients are awake, the muscles that surround the throat are active and keep this region open for breathing. These muscles relax during sleep, allowing the tongue and other structures to collapse  and block breathing.  There are several different tongue procedures available.  Selection of a tongue base procedure depends on characteristics seen on physical exam.  Generally, procedures are aimed at removing bulky tissues in this area or preventing the back of the tongue from falling back during sleep.  Success rates for tongue surgery range from 50-62%3.    Hypoglossal Nerve Stimulation:  Hypoglossal nerve stimulation has recently received approval from the [place] Food and Drug Administration for the treatment of obstructive sleep apnea.  This is based on research showing that the system was safe and effective in treating sleep apnea6.  Results showed that the median AHI score decreased 68%, from 29.3 to 9.0. This therapy uses an implant system that senses breathing patterns and delivers mild stimulation to airway muscles, which keeps the airway open during sleep.  The system consists of three fully implanted components: a small generator (similar in size to a pacemaker), a breathing sensor, and a stimulation lead.  Using a small handheld remote, a patient turns the therapy on before bed and off upon awakening.    Candidates for this device must be greater than 22 years of age, have moderate to severe RACHNA (AHI between 20-65), BMI less than 32, have tried CPAP/oral appliance without success, and have appropriate upper airway anatomy (determined by a sleep endoscopy performed by Dr. Wood).    Hypoglossal Nerve Stimulation Pathway:    The sleep surgeon s office will work with the patient through the insurance prior-authorization process (including communications and appeals).    Nasal Procedures:  Nasal obstruction can interfere with nasal breathing during the day and night.  Studies have shown that relief of nasal obstruction can improve the ability of some patients to tolerate positive airway pressure therapy for obstructive sleep apnea1.  Treatment options include medications such as nasal saline,  topical corticosteroid and antihistamine sprays, and oral medications such as antihistamines or decongestants. Non-surgical treatments can include external nasal dilators for selected patients. If these are not successful by themselves, surgery can improve the nasal airway either alone or in combination with these other options.      Combination Procedures:  Combination of surgical procedures and other treatments may be recommended, particularly if patients have more than one area of narrowing or persistent positional disease.  The success rate of combination surgery ranges from 66-80%2,3.      1. Jasmyn VEGA. The Role of the Nose in Snoring and Obstructive Sleep Apnoea: An Update.  Eur Arch Otorhinolaryngol. 2011; 268: 1365-73.  2.  Carly SM; Yaron JA; Kaila JR; Pallanch JF; Hansa MB; Johnathan SG; Bibi QUINTERO. Surgical modifications of the upper airway for obstructive sleep apnea in adults: a systematic review and meta-analysis. SLEEP 2010;33(10):4812-2016. Juan David WICK. Hypopharyngeal surgery in obstructive sleep apnea: an evidence-based medicine review.  Arch Otolaryngol Head Neck Surg. 2006 Feb;132(2):206-13.  3. Kamlesh YH1, Salima Y, Emir DESTINY. The efficacy of anatomically based multilevel surgery for obstructive sleep apnea. Otolaryngol Head Neck Surg. 2003 Oct;129(4):327-35.  4. Juan David WICK, Goldberg A. Hypopharyngeal Surgery in Obstructive Sleep Apnea: An Evidence-Based Medicine Review. Arch Otolaryngol Head Neck Surg. 2006 Feb;132(2):206-13.  5. Sahil CHATTERJEE et al. Upper-Airway Stimulation for Obstructive Sleep Apnea.  N Engl J Med. 2014 Jan 9;370(2):139-49.  6. Petaj Y et al. Increased Incidence of Cardiovascular Disease in Middle-aged Men with Obstructive Sleep Apnea. Am J Respir Crit Care Med; 2002 166: 159-165  7. Nav MOSS et al. Studying Life Effects and Effectiveness of Palatopharyngoplasty (SLEEP) study: Subjective Outcomes of Isolated Uvulopalatopharyngoplasty. Otolaryngol Head Neck Surg. 2011; 144:  612-084.

## 2017-04-19 NOTE — NURSING NOTE
"Chief Complaint   Patient presents with     Consult     Discuss snoring, grinding teeth, and moves alot while sleeping.       Initial Ht 1.765 m (5' 9.5\")  Wt 93.9 kg (207 lb)  BMI 30.13 kg/m2 Estimated body mass index is 30.13 kg/(m^2) as calculated from the following:    Height as of this encounter: 1.765 m (5' 9.5\").    Weight as of this encounter: 93.9 kg (207 lb).  Medication Reconciliation: complete     Neck circumference: 16 inches.  40cm      HILTON Carbajal        "

## 2017-04-19 NOTE — PROGRESS NOTES
Sleep Consultation:    Date on this visit: 4/19/2017    Aydin Jang is sent by Belia Pederson for a sleep consultation regarding possible RACHNA.    Primary Physician: Clinic, [facility]     Aydin Jang is a 31 year old male presenting with complaints of snoring, tossing and turning, not getting enough sleep, and teeth grinding.  Girlfriend has encouraged him to get this all checked out.    He has no medical history.    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  Works 6:30 AM - 3 PM M - F although works from home on Thursdays.     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   Alarm is set for 4:30 or 5:30 depending on how tired he is.  May snooze for up to 30 minutes.     Doesn't typically nap as he feels worse.     On work nights usually trying to getting into bed between 10:30 and 11 PM but sometimes as late as 12:30 AM.  Finds sleep is somewhat restless according to sleep monitoring paul and Charge2 (HR and movement monitor).      Sleep Disordered Breathing - Snoring can be light to loud depending on the person and occasion.  No snort arousals or witnessed apneas.   No nocturia unless drinking.  No nocturnal GERD.    He denies morning headaches.  Does not usually get morning dry mouth.  No regular morning sinus congestion.   Patient was counseled on the importance of driving while alert, to pull over if drowsy, or nap before getting into the vehicle if sleepy.    Movement/Behaviors - No somniloquy.  No somnambulism.  No sleep related eating.  No dream enactment behavior.   Patient denies typical restless legs syndrome symptoms.     Bruxism noted with teeth wear per dentist.     Alcohol use - 5 - 6 nights per week will drink.  Typically having 2 - 3 drinks but may consume as many as 7 in social occasions.  Caffeine intake - 3 cups/day of coffee or  tea. Last caffeine intake is usually in the morning.  Tobacco exposure - Roommate smokes outside of home.  Illicit substances - None    Lives with 2 roommates and GF stays over on weekends.  Has no pets.     Does have family history of snoring in both brothers and Obstructive Sleep Apnea in father (does have CPAP but not sure if it is regularly used).    Allergies:    No Known Allergies    Medications:    Current Outpatient Prescriptions   Medication Sig Dispense Refill     ascorbic acid (VITAMIN C) 250 MG CHEW Take 250 mg by mouth as needed (3 times a week)          Problem List:  Patient Active Problem List    Diagnosis Date Noted     CARDIOVASCULAR SCREENING; LDL GOAL LESS THAN 160 10/31/2010     Priority: Medium        Past Medical/Surgical History:  Past Medical History:   Diagnosis Date     NO ACTIVE PROBLEMS      Past Surgical History:   Procedure Laterality Date     C NONSPECIFIC PROCEDURE  ? 1999    L wrist cyst removal     EYE SURGERY  07/15/16    PRK both eyes       Social History:  Social History     Social History     Marital status: Single     Spouse name: N/A     Number of children: N/A     Years of education: N/A     Occupational History     Not on file.     Social History Main Topics     Smoking status: Never Smoker     Smokeless tobacco: Not on file     Alcohol use Yes      Comment: 10 drinks a week      Drug use: No     Sexual activity: No     Other Topics Concern     Not on file     Social History Narrative       Family History:  Family History   Problem Relation Age of Onset     Obesity Father      GASTROINTESTINAL DISEASE Father      Lipids Father      DIABETES Maternal Grandmother      Hypertension Paternal Grandmother      Lipids Paternal Grandmother      Obesity Paternal Grandmother        Review of Systems:  A complete review of systems reviewed by me is negative with the exeption of what has been mentioned in the history of present illness.    Physical Examination:  Vitals: /86 (BP  "Location: Right arm, Cuff Size: Adult Regular)  Pulse 58  Ht 1.765 m (5' 9.5\")  Wt 93.9 kg (207 lb)  SpO2 100%  BMI 30.13 kg/m2  BMI= Body mass index is 30.13 kg/(m^2).    [place] Total Score 4/19/2017   Total score - [place] 2     General appearance: No apparent distress, well groomed.    HEENT:   Head: Normocephalic, atraumatic.  Eyes: PERRL  Nose: Nares widely patent.  No exudate.  No septal deviation noted.  Mouth: Teeth: Normal               Tongue: Normal  Oropharynx:  Mallampati Classification: I    Tonsils: Grade 1    Uvula: Normal    Neck: Supple without bruit.     Neck Cir (cm): 40 cm (4/19/2017 12:38 PM)    Cardiac: Regular rate and rhythm.  No murmurs.  Radial pulses are strong and symmetric.  Pulmonary: Symmetric air movement, lungs clear to auscultation bilaterally.  Musculoskeletal: No edema noted.  No clubbing or cyanosis.  Skin: Warm, dry, intact.  Neurologic: Alert and oriented to person, place and time   Gait is normal.  Psychiatric: Affect pleasant.  Mood good.    Impression/Plan:  1. Snoring  I have a suspicion for RACHNA based on presence of snoring and excessive daytime sleepiness. We discussed pathophysiology of obstructive sleep apnea, testing with overnight polysomnography, and treatment modalities (CPAP only discussed at this visit). We discussed consequences of untreated RACHNA. Patient is interested in treatment and willing to undergo overnight sleep testing.   We also discussed effects of alcohol consumption on airway patency.    Home sleep testing is inappropriate for this patient due to lack of high pre-test probability increasing chances that Home Sleep Apnea Testing will provide inaccurate results.  StopBang Total Score: 3 (4/19/2017  1:00 PM)    Study has been ordered today.    - SLEEP EVALUATION & MANAGEMENT REFERRAL - ADULT  - Comprehensive Sleep Study; Future    2. Sleep disturbance  Requested referral due to sleep disturbance likely due to #1 and #3  - SLEEP EVALUATION & " MANAGEMENT REFERRAL - ADULT    3. Behaviorally induced insufficient sleep syndrome  Counseled on the effects of insufficient sleep and long-term health consequences.  Encouraged to target more sleep intake as a health goal.    4. Excessive sleepiness  Likely related to #3 and to a lesser extent #1.  - Comprehensive Sleep Study; Future    Literature provided regarding sleep apnea.      He will follow up with me in approximately two weeks after his sleep study has been competed to review the results and discuss plan of care.       Polysomnography reviewed.  Obstructive sleep apnea reviewed.  Complications of untreated sleep apnea were reviewed.    Parker Baird MD, Sleep Physician  Apr 19, 2017     CC: Belia Pederson

## 2017-05-09 ENCOUNTER — OFFICE VISIT (OUTPATIENT)
Dept: PSYCHOLOGY | Facility: CLINIC | Age: 32
End: 2017-05-09
Attending: PHYSICIAN ASSISTANT
Payer: COMMERCIAL

## 2017-05-09 DIAGNOSIS — F41.9 ANXIETY: Primary | ICD-10-CM

## 2017-05-09 PROCEDURE — 90834 PSYTX W PT 45 MINUTES: CPT | Performed by: COUNSELOR

## 2017-05-09 ASSESSMENT — ANXIETY QUESTIONNAIRES
3. WORRYING TOO MUCH ABOUT DIFFERENT THINGS: NOT AT ALL
5. BEING SO RESTLESS THAT IT IS HARD TO SIT STILL: NOT AT ALL
6. BECOMING EASILY ANNOYED OR IRRITABLE: NOT AT ALL
7. FEELING AFRAID AS IF SOMETHING AWFUL MIGHT HAPPEN: NOT AT ALL
1. FEELING NERVOUS, ANXIOUS, OR ON EDGE: NOT AT ALL
GAD7 TOTAL SCORE: 0
IF YOU CHECKED OFF ANY PROBLEMS ON THIS QUESTIONNAIRE, HOW DIFFICULT HAVE THESE PROBLEMS MADE IT FOR YOU TO DO YOUR WORK, TAKE CARE OF THINGS AT HOME, OR GET ALONG WITH OTHER PEOPLE: NOT DIFFICULT AT ALL
2. NOT BEING ABLE TO STOP OR CONTROL WORRYING: NOT AT ALL

## 2017-05-09 ASSESSMENT — PATIENT HEALTH QUESTIONNAIRE - PHQ9: 5. POOR APPETITE OR OVEREATING: NOT AT ALL

## 2017-05-09 NOTE — PROGRESS NOTES
"               Progress Note - Initial Session    Client Name:  Aydin Jang Date: 5/9/2017         Service Type: Individual      Session Start Time: 2:00p  Session End Time: 2:45p      Session Length: 38 - 52      Session #: 1     Attendees: Client attended alone         Diagnostic Assessment in progress.  Unable to complete documentation at the conclusion of the first session due to assessing and addressing mild anxiety symptoms and stressors in the past month that led to a \"nervous breakdown\".       Mental Status Assessment:  Appearance:   Appropriate   Eye Contact:   Good   Psychomotor Behavior: Normal    Attitude:   Cooperative   Orientation:   All  Speech   Rate / Production: Hyperverbal  Normal    Volume:  Normal   Mood:    Somewhat anxious  Normal  Affect:    Appropriate   Thought Content:  Clear   Thought Form:  Coherent  Circumstantial  Insight:    Fair       Safety Issues and Plan for Safety and Risk Management:  Client denies current fears or concerns for personal safety.  Client denies current or recent suicidal ideation or behaviors.  Client denies current or recent homicidal ideation or behaviors.  Client denies current or recent self injurious behavior or ideation.  Client denies other safety concerns.  A safety and risk management plan has not been developed at this time, however client was given the after-hours number / 911 should there be a change in any of these risk factors.  Client reports there are no firearms in the house.      Diagnostic Criteria:   - Anxiety and worry about a number of events or activities (such as work or school performance).    - The person finds it difficult to control the worry.   - Difficulty concentrating or mind going blank.    - Sleep disturbance (difficulty falling or staying asleep, or restless unsatisfying sleep).       DSM5 Diagnoses: (Sustained by DSM5 Criteria Listed Above)  Diagnoses: 300.00 (F41.9) Unspecified Anxiety Disorder; RULE .23 (F40.10) " Social Anxiety Disorder  Psychosocial & Contextual Factors: Work stress, relationship stress  WHODAS 2.0 (12 item)            This questionnaire asks about difficulties due to health conditions. Health conditions  include  disease or illnesses, other health problems that may be short or long lasting,  injuries, mental health or emotional problems, and problems with alcohol or drugs.                     Think back over the past 30 days and answer these questions, thinking about how much  difficulty you had doing the following activities. For each question, please [place] only  one response.    S1 Standing for long periods such as 30 minutes? None =         1   S2 Taking care of household responsibilities? None =         1   S3 Learning a new task, for example, learning how to get to a new place? None =         1   S4 How much of a problem do you have joining community activities (for example, festivals, Jain or other activities) in the same way as anyone else can? None =         1   S5 How much have you been emotionally affected by your health problems? None =         1     In the past 30 days, how much difficulty did you have in:   S6 Concentrating on doing something for ten minutes? None =         1   S7 Walking a long distance such as a kilometer (or equivalent)? None =         1   S8 Washing your whole body? None =         1   S9 Getting dressed? None =         1   S10 Dealing with people you do not know? None =         1   S11 Maintaining a friendship? None =         1   S12 Your day to day work? None =         1     H1 Overall, in the past 30 days, how many days were these difficulties present? Record number of days 0   H2 In the past 30 days, for how many days were you totally unable to carry out your usual activities or work because of any health condition? Record number of days 0   H3 In the past 30 days, not counting the days that you were totally unable, for how many days did you cut back or reduce  your usual activities or work because of any health condition? Record number of days 0       Collateral Reports Completed:  Routed note to PCP      PLAN: (Homework, other):  Client stated that he may follow up for ongoing services with [facility].  Continue to assess and address anxiety symptoms and ongoing stressors, complete DA, and start treatment plan.       David Geller [facility]

## 2017-05-09 NOTE — MR AVS SNAPSHOT
MRN:[id number]                      After Visit Summary   5/9/2017    Aydin Jang    MRN: [id number]           Visit Information        Provider Department      5/9/2017 2:00 PM [facility]      Your next 10 appointments already scheduled     May 22, 2017 11:00 AM CDT   Return Visit with David Geller [facility] ([facility])    [facility]  [address]  [facility] [id number] [phone]              MyChart Information     [employer] gives you secure access to your electronic health record. If you see a primary care provider, you can also send messages to your care team and make appointments. If you have questions, please call your primary care clinic.  If you do not have a primary care provider, please call [phone] and they will assist you.        Care EveryWhere ID     This is your Care EveryWhere ID. This could be used by other organizations to access your [facility] medical records  [id number]

## 2017-05-09 NOTE — Clinical Note
Hi, Client completed first intake appt for therapy. He currently meets criteria for unspecified anxiety. He reported significant improvement in sleep and anxiety in the past month due to stressors being resolved. He seemed ambivalent about continuing therapy, but was willing to schedule a follow up appt to complete intake. Please let me know if you have any questions or concerns.  Thank you, David Geller MA, [facility]

## 2017-05-10 ASSESSMENT — PATIENT HEALTH QUESTIONNAIRE - PHQ9: SUM OF ALL RESPONSES TO PHQ QUESTIONS 1-9: 0

## 2017-05-10 ASSESSMENT — ANXIETY QUESTIONNAIRES: GAD7 TOTAL SCORE: 0

## 2017-05-22 ENCOUNTER — OFFICE VISIT (OUTPATIENT)
Dept: PSYCHOLOGY | Facility: CLINIC | Age: 32
End: 2017-05-22
Payer: COMMERCIAL

## 2017-05-22 ENCOUNTER — FCC EXTENDED DOCUMENTATION (OUTPATIENT)
Dept: PSYCHOLOGY | Facility: CLINIC | Age: 32
End: 2017-05-22

## 2017-05-22 DIAGNOSIS — F41.9 ANXIETY: Primary | ICD-10-CM

## 2017-05-22 PROCEDURE — 90791 PSYCH DIAGNOSTIC EVALUATION: CPT | Performed by: COUNSELOR

## 2017-05-22 NOTE — PROGRESS NOTES
"                                                                                                                                                                      Adult Intake Structured Interview  Standard Diagnostic Assessment      CLIENT'S NAME: Aydin Jang  MRN:   [id number]  :   1985  ACCT. NUMBER: [id number]  DATE OF SERVICE: 17      Identifying Information:  Client is a 31 year old, , partnered / significant other male. Client was referred for counseling by Belia Pederson at [facility]. Client is currently employed full time. Client attended the session alone.       Client's Statement of Presenting Concern:  Client reports the reason for seeking therapy at this time as \"stress and sleep deprivation induced nervous breakdown\" and some social anxiety.  Client stated that his symptoms have resulted in the following functional impairments: management of the household and or completion of tasks, relationship(s), self-care, social interactions and work / vocational responsibilities.      History of Presenting Concern:  Client reports that these problem(s) began 1-2 months ago when client was experiencing multiple stressors and could not sleep: buying a house for his parents, adapting to new relationship, and working 2 jobs. Reported not feeling like himself and had to take a day off from work. Client has attempted to resolve these concerns in the past through better balance of time, talking with girlfriend, and better sleep habits. For social anxiety, client reported feeling nervous in large crowds and reported the following ways to manage: avoiding, leaving situations without saying anything to friends, stopping by when events are slower, and going places with a group of friends. Client reports that other professional(s) are not involved in providing support / services.       Social History:  Client reported he grew up in [place], [place]. They were the " first born of 4 children. This is an intact family and parents remain . Client reported that his childhood was good and typical. Described himself to be free flowing and to not have have one strong social support network. Identified being an odd ball and nerdy due to his interest in computer. Recalled some experiences of bullying, but reported not being impacted by it and being able to dismiss negative comments. Client described his current relationships with family of origin as fine with father (said father is short tempered, but they share similar hobbies, great with mom (can talk about personal things with her), closest with sister, and some strain with 2 younger brothers (butting heads, some jealously, and stubbornness from brothers; youngest brother has very consersative views).    Client reported a history of 1 committed relationship - reported girlfriend identifies as gender queer and is very involved with the [facility] community. Client has been partnered / significant other for 6 months (longest lasting relationship). Client reported having 0 children. Client identified some stable and meaningful social connections. Client reported that he has not been involved with the legal system. Client's highest education level was college graduate. Client did not identify any learning problems. There are no ethnic, cultural or Jew factors that may be relevant for therapy. Client identified his preferred language to be English. Client reported he does not need the assistance of an  or other support involved in therapy. Modifications will not be used to assist communication in therapy. Client did not serve in the .     Client reports family history includes DIABETES in his maternal grandmother; GASTROINTESTINAL DISEASE in his father; Hypertension in his paternal grandmother; Lipids in his father and paternal grandmother; Obesity in his father and paternal grandmother.      Mental Health  History:  Client reported no family history of mental health issues.  Client has not been previously diagnosed with a mental health diagnosis.  Client has not received mental health services in the past.  Hospitalizations: None.  Client is not currently receiving any mental health services.      Chemical Health History:  Client reported the following biological family members or relatives with chemical health issues: Brothers reportedly uses alcohol and had DWI. Client has not received chemical dependency treatment in the past. Client is not currently receiving any chemical dependency treatment. Client reported the following problems as a result of drinking: drinking while drunk, but no accident or DWI.      Client Reports:  Client reports using alcohol 2-6 times per week and has 1-4 drinks at a time. Client first started drinking in college.  Client denies using tobacco.  Client denies using marijuana.  Client reports using caffeine 4-7 times per week and drinks 2.5 cups at a time. Client started using caffeine in college.  Client denies using street drugs.  Client denies the non-medical use of prescription or over the counter drugs.    CAGE: None of the patient's responses to the CAGE screening were positive / Negative CAGE score   Based on the negative Cage-Aid score and clinical interview there are not indications of drug or alcohol abuse.    Discussed the general effects of drugs and alcohol on health and well-being. Therapist gave client printed information about the effects of chemical use on his health and well being.      Significant Losses / Trauma / Abuse / Neglect Issues:  There are indications or report of significant loss, trauma, abuse or neglect issues related to: death of grandmother, grandfather, cousin, and best friend from college's mother.    Issues of possible neglect are not present.      Medical Issues:  Client has not had a physical exam to rule out medical causes for current symptoms. Date  of last physical exam was within the past year. Client was encouraged to follow up with PCP if symptoms were to develop. The client does not have a Primary Care Provider and was encouraged to establish care with a PCP. The client reports not having a psychiatrist. Client reports no current medical concerns. The client denies the presence of chronic or episodic pain. There are not significant nutritional concerns.     Client reports current meds as:   Current Outpatient Prescriptions   Medication Sig     ascorbic acid (VITAMIN C) 250 MG CHEW Take 250 mg by mouth as needed (3 times a week)      No current facility-administered medications for this visit.        Client Allergies:  No Known Allergies      Medical History:  Past Medical History:   Diagnosis Date     NO ACTIVE PROBLEMS        Medication Adherence:  Client reports taking prescribed medications as prescribed.    Client was provided recommendation to follow-up with prescribing physician.      Mental Status Assessment:  Appearance:   Appropriate   Eye Contact:   Good   Psychomotor Behavior: Normal   Attitude:   Cooperative   Orientation:   All  Speech   Rate / Production: Hyperverbal  Normal    Volume:  Normal   Mood:    Normal  Affect:    Appropriate  Bright   Thought Content:  Clear   Thought Form:  Coherent  Tangential  Circumstantial  Insight:    Good       Review of Symptoms:  Depression: No symptoms  Cici:  No symptoms  Psychosis: No symptoms  Anxiety: Nervousness  Panic:  No symptoms  Post Traumatic Stress Disorder: No symptoms  Obsessive Compulsive Disorder: No symptoms  Eating Disorder: No symptoms  Oppositional Defiant Disorder: No symptoms  ADD / ADHD: No symptoms  Conduct Disorder: No symptoms      Safety Issues and Plan for Safety and Risk Management:  Client denies a history of suicidal ideation, suicide attempts, self-injurious behavior, homicidal ideation, homicidal behavior and and other safety concerns  Client denies current fears or  concerns for personal safety.  Client denies current or recent suicidal ideation or behaviors.  Client denies current or recent homicidal ideation or behaviors.  Client denies current or recent self injurious behavior or ideation.  Client denies other safety concerns.  Client reports there are no firearms in the house.  A safety and risk management plan has not been developed at this time, however client was given the after-hours number / 911 should there be a change in any of these risk factors.      Client's Strengths and Limitations:  Client identified the following strengths or resources that will help him succeed in counseling: intelligence. Client identified the following supports: girlfriend and friends. Things that may interfere with the clients success in counseling include: lack of commitment and time constraints.      Diagnostic Criteria:  - Anxiety and worry about a number of events or activities (such as work or school performance).   - The person finds it difficult to control the worry.  - Difficulty concentrating or mind going blank.   - Sleep disturbance (difficulty falling or staying asleep, or restless unsatisfying sleep).       Functional Status:  Client's symptoms have caused reduced functional status in the following areas: Activities of Daily Living, Occupational / Vocational, and Social / Relational.      DSM5 Diagnoses: (Sustained by DSM5 Criteria Listed Above)  Diagnoses: 300.00 (F41.9) Unspecified Anxiety Disorder; RULE .23 (F40.10) Social Anxiety Disorder  Psychosocial & Contextual Factors: Work stress, some adjustments in new relationship, some strained family relationships  WHODAS 2.0 (12 item)            This questionnaire asks about difficulties due to health conditions. Health conditions  include  disease or illnesses, other health problems that may be short or long lasting,  injuries, mental health or emotional problems, and problems with alcohol or drugs.                      Think back over the past 30 days and answer these questions, thinking about how much  difficulty you had doing the following activities. For each question, please Hamilton only  one response.    S1 Standing for long periods such as 30 minutes? None =         1   S2 Taking care of household responsibilities? None =         1   S3 Learning a new task, for example, learning how to get to a new place? None =         1   S4 How much of a problem do you have joining community activities (for example, festivals, Congregational or other activities) in the same way as anyone else can? None =         1   S5 How much have you been emotionally affected by your health problems? None =         1     In the past 30 days, how much difficulty did you have in:   S6 Concentrating on doing something for ten minutes? None =         1   S7 Walking a long distance such as a kilometer (or equivalent)? None =         1   S8 Washing your whole body? None =         1   S9 Getting dressed? None =         1   S10 Dealing with people you do not know? None =         1   S11 Maintaining a friendship? None =         1   S12 Your day to day work? None =         1     H1 Overall, in the past 30 days, how many days were these difficulties present? Record number of days 0   H2 In the past 30 days, for how many days were you totally unable to carry out your usual activities or work because of any health condition? Record number of days 0   H3 In the past 30 days, not counting the days that you were totally unable, for how many days did you cut back or reduce your usual activities or work because of any health condition? Record number of days 0     Attendance Agreement:  Client has signed Attendance Agreement:Yes      Preliminary Treatment Plan:  The client reports no currently identified Congregational, ethnic or cultural issues relevant to therapy.     services are not indicated.    Modifications to assist communication are not indicated.    The  concerns identified by the client will be addressed in therapy.    Initial Treatment will focus on: Anxiety.    As a preliminary treatment goal, client will experience a reduction in anxiety, will develop more effective coping skills to manage anxiety symptoms, will develop healthy cognitive patterns and beliefs and will increase ability to function adaptively.    The focus of initial interventions will be to alleviate anxiety, alleviate lability of mood, facilitate appropriate expression of feelings, increase ability to function adaptively, increase coping skills, provide homework to reinforce skill development, teach CBT skills, teach communication skills, teach conflict management skills, teach DBT skills, teach distress tolerance skills, teach mindfulness skills, teach problem-solving skills, teach relaxation strategies, teach sleep hygiene, teach social skills and teach stress mangement techniques.    Collaboration with other professionals is not indicated at this time.    Referral to another professional/service is not indicated at this time.    A Release of Information is not needed at this time.    Report to child / adult protection services was NA.    Client will have access to their [facility] medical record.    David Geller [facility]  May 22, 2017

## 2017-05-22 NOTE — PROGRESS NOTES
Discharge Summary  Multiple Sessions    Client Name: Aydin Jang MRN#: [id number] YOB: 1985      Intake / Discharge Date: 5/22/2017      DSM5 Diagnoses: (Sustained by DSM5 Criteria Listed Above)  Diagnoses: 300.00 (F41.9) Unspecified Anxiety Disorder; RULE .23 (F40.10) Social Anxiety Disorder  Psychosocial & Contextual Factors: Work stress, some adjustments in new relationship, some strained family relationships  WHODAS 2.0 (12 item) Score: 12          Presenting Concern:  Stress and sleep deprivation induced nervous breakdown and some social anxiety.      Reason for Discharge:  Client is satisfied with progress and feels he does not need therapy at this time due to better management of stressors.       Disposition at Time of Last Encounter:   Comments:   Stable, bright, has great support from girlfriend, friends, and mother, is focused on his 2 jobs     Risk Management:   Client denies a history of suicidal ideation, suicide attempts, self-injurious behavior, homicidal ideation, homicidal behavior and and other safety concerns  A safety and risk management plan has not been developed at this time, however client was given the after-hours number / 911 should there be a change in any of these risk factors.      Referred To:  Referral source.        IFRAH Stover   5/22/2017

## 2017-05-22 NOTE — MR AVS SNAPSHOT
MRN:[id number]                      After Visit Summary   5/22/2017    Aydin Jang    MRN: [id number]           Visit Information        Provider Department      5/22/2017 11:00 AM David Geller [facility]      MyChart Information     MyChart gives you secure access to your electronic health record. If you see a primary care provider, you can also send messages to your care team and make appointments. If you have questions, please call your primary care clinic.  If you do not have a primary care provider, please call [phone] and they will assist you.        Care EveryWhere ID     This is your Care EveryWhere ID. This could be used by other organizations to access your [place] medical records  [id number]

## 2017-05-22 NOTE — Clinical Note
Tho Celaya, Client completed intake for therapy, but declined services at this time due to better management of stressors. He was encouraged to return should symptoms or stressors worsen. I also encouraged him to follow through with sleep study. Client seemed agreeable. Please contact me with any questions or concerns.  Thank you, David Geller MA, [facility]

## 2017-07-06 ENCOUNTER — FCC EXTENDED DOCUMENTATION (OUTPATIENT)
Dept: PSYCHOLOGY | Facility: CLINIC | Age: 32
End: 2017-07-06

## 2017-07-06 NOTE — PROGRESS NOTES
"                    Discharge Summary  Multiple Sessions    Client Name: Aydin Jang MRN#: [id number] YOB: 1985      Intake / Discharge Date: 7/6/2017      DSM5 Diagnoses: (Sustained by DSM5 Criteria Listed Above)  Diagnoses: 300.00 (F41.9) Unspecified Anxiety Disorder; RULE .23 (F40.10) Social Anxiety Disorder  Psychosocial & Contextual Factors: Work stress, some adjustments in new relationship, some strained family relationships  WHODAS 2.0 (12 item) Score: 12          Presenting Concern:  Client reports the reason for seeking therapy as \"stress and sleep deprivation induced nervous breakdown\" and some social anxiety.      Reason for Discharge:  Client is satisfied with progress, felt like stressors and symptoms were resolving.       Disposition at Time of Last Encounter:   Comments:   Stable, very mild anxiety, cooperative, attentive, bright     Risk Management:   Client denies a history of suicidal ideation, suicide attempts, self-injurious behavior, homicidal ideation, homicidal behavior and and other safety concerns  A safety and risk management plan has not been developed at this time, however client was given the after-hours number / 911 should there be a change in any of these risk factors.      Referred To:  PCP        IFRAH Stover   7/6/2017    "

## 2017-07-26 ENCOUNTER — DOCUMENTATION ONLY (OUTPATIENT)
Dept: SLEEP MEDICINE | Facility: CLINIC | Age: 32
End: 2017-07-26

## 2017-07-26 DIAGNOSIS — G47.33 OSA (OBSTRUCTIVE SLEEP APNEA): Primary | ICD-10-CM

## 2017-07-26 NOTE — PROGRESS NOTES
Received the following E-mail. Please review and respond with action plan:    Good Morning    For the sleep study scheduled on 07/30/2017 at [facility] for Pt:    Aydin Jang  Male, 31 yrs, 1985  MRN: [id number]    He does not meet criteria for and in lab study.      A P2P may be done within 24 hours by calling [phone]  OR  The Pt can have an HST    Let me know if there are any questions

## 2017-07-26 NOTE — Clinical Note
Ordered Home Sleep Apnea Testing.  Routing to [facility] to call and schedule.

## 2017-07-31 ENCOUNTER — OFFICE VISIT (OUTPATIENT)
Dept: SLEEP MEDICINE | Facility: CLINIC | Age: 32
End: 2017-07-31
Attending: INTERNAL MEDICINE
Payer: COMMERCIAL

## 2017-07-31 DIAGNOSIS — G47.33 OSA (OBSTRUCTIVE SLEEP APNEA): ICD-10-CM

## 2017-07-31 PROCEDURE — G0399 HOME SLEEP TEST/TYPE 3 PORTA: HCPCS | Mod: ZF

## 2017-07-31 NOTE — PROGRESS NOTES
Patient presented to clinic for  and demonstration of the home sleep test. Patient was set up and instructed use. Patient verbalized understanding and will be returning device after 8am tomorrow    Patient was given sleep logs and written instructions for use    HILTON Morris  Clinic Coordinator   Registered Medical Assistant   [facility]

## 2017-07-31 NOTE — PATIENT INSTRUCTIONS
My home sleep study:    ______I will activate the device as shown on the video    ___X___My device is programmed to start automatically at     __11P.M____________ Time   on __07/31/2017____________  Day/Date    My contact number if I have problems is __[phone]_______________      I will watch the video before I hook it up at night: [email or web address]    In case of an emergency call [phone]

## 2017-07-31 NOTE — MR AVS SNAPSHOT
After Visit Summary   7/31/2017    Aydin Jang    MRN: [id number]           Patient Information     Date Of Birth          1985        Visit Information        Provider Department      7/31/2017 11:00 AM [facility]        Today's Diagnoses     RACHNA (obstructive sleep apnea)          Care Instructions    My home sleep study:    ______I will activate the device as shown on the video    ___X___My device is programmed to start automatically at     __11P.M____________ Time   on __07/31/2017____________  Day/Date    My contact number if I have problems is __[phone]_______________      I will watch the video before I hook it up at night: [email or web address]    In case of an emergency call [phone]                    Follow-ups after your visit        Your next 10 appointments already scheduled     Aug 01, 2017 10:00 AM CDT   HST Drop Off with DME SCHEDULE   [facility]    [address] [place] [id number] [phone]            Aug 10, 2017 10:00 AM CDT   Return Sleep Patient with Parker Baird MD   [facility] ([facility])    [address] [id number] [phone]              Who to contact     If you have questions or need follow up information about today's clinic visit or your schedule please contact [facility] directly at [phone].  Normal or non-critical lab and imaging results will be communicated to you by MyChart, letter or phone within 4 business days after the clinic has received the results. If you do not hear from us within 7 days, please contact the clinic through MyChart or phone. If you have a critical or abnormal lab result, we will notify you by phone as soon as possible.  Submit refill requests through [employer] or call your pharmacy and they will forward  the refill request to us. Please allow 3 business days for your refill to be completed.          Additional Information About Your Visit        [employer] Information     [employer] gives you secure access to your electronic health record. If you see a primary care provider, you can also send messages to your care team and make appointments. If you have questions, please call your primary care clinic.  If you do not have a primary care provider, please call [phone] and they will assist you.        Care EveryWhere ID     This is your Care EveryWhere ID. This could be used by other organizations to access your [place] medical records  [id number]         Blood Pressure from Last 3 Encounters:   04/19/17 131/86   04/05/17 133/82   11/21/16 136/84    Weight from Last 3 Encounters:   04/19/17 93.9 kg (207 lb)   04/05/17 93.8 kg (206 lb 12.8 oz)   11/21/16 93.9 kg (207 lb)              We Performed the Following     HST-Home Sleep Apnea Test        Primary Care Provider Office Phone #    [facility] [phone]       [address] [place] [id number]        Equal Access to Services     TJ HERNANDEZ : Hadii aby lange Socarlos, waaxda lurealadaha, qaybta kaalmatimothy huggins, savita merlos. So [facility] [phone].    ATENCIÓN: Si habla español, tiene a gunn disposición servicios gratuitos de asistencia lingüística. [facility] [phone].    We comply with applicable federal civil rights laws and [place] laws. We do not discriminate on the basis of race, color, national origin, age, disability sex, sexual orientation or gender identity.            Thank you!     Thank you for choosing [facility], SLEEP STUDY  for your care. Our goal is always to provide you with excellent care. Hearing back from our patients is one way we can continue to improve our services. Please take a few minutes to complete the written survey that you may receive in the mail after  your visit with us. Thank you!             Your Updated Medication List - Protect others around you: Learn how to safely use, store and throw away your medicines at www.disposemymeds.org.          This list is accurate as of: 7/31/17 11:08 AM.  Always use your most recent med list.                   Brand Name Dispense Instructions for use Diagnosis    ascorbic acid 250 MG Chew chewable tablet    vitamin C     Take 250 mg by mouth as needed (3 times a week)

## 2017-08-01 ENCOUNTER — DOCUMENTATION ONLY (OUTPATIENT)
Dept: SLEEP MEDICINE | Facility: CLINIC | Age: 32
End: 2017-08-01
Attending: INTERNAL MEDICINE
Payer: COMMERCIAL

## 2017-08-01 NOTE — PROGRESS NOTES
Hst was returned and given to tech to score.     HILTON Morris  Clinic Coordinator   Registered Medical Assistant   [facility]

## 2017-08-02 PROBLEM — G47.33 OSA (OBSTRUCTIVE SLEEP APNEA): Status: ACTIVE | Noted: 2017-08-02

## 2017-08-02 NOTE — PROCEDURES
"HOME SLEEP STUDY INTERPRETATION    Patient: Aydin Jang  MRN: [id number]  YOB: 1985  Study Date: 7/31/2017  Referring Provider: Clinic, [facility];   Ordering Provider: Parker Baird MD     Indications for Home Study: Aydin Jang is a 31 year old male with a history of obesity who presents with symptoms suggestive of obstructive sleep apnea.    Estimated body mass index is 30.13 kg/(m^2) as calculated from the following:    Height as of 4/19/17: 1.765 m (5' 9.5\").    Weight as of 4/19/17: 93.9 kg (207 lb).  Total score - [place]: 2 (4/19/2017 12:00 PM)  StopBang Total Score: 3 (4/19/2017  1:00 PM)    Data: A full night home sleep study was performed recording the standard physiologic parameters including body position, movement, sound, nasal pressure, thermal oral airflow, chest and abdominal movements with respiratory inductance plethysmography, and oxygen saturation by pulse oximetry. Pulse rate was estimated by oximetry recording. This study was considered adequate based on > 4 hours of quality oximetry and respiratory recording. As specified by the AASM Manual for the Scoring of Sleep and Associated events, version 2.3, Rule VIII.D 1B, 4% oxygen desaturation scoring for hypopneas is used as a standard of care on all home sleep apnea testing.    Analysis Time:  397.7 minutes    Respiration:   Sleep Associated Hypoxemia: sustained hypoxemia was not present. Baseline oxygen saturation was 94.3%.  Time with saturation less than or equal to 88% was 1.2 minutes. The lowest oxygen saturation was 82%.   Snoring: Snoring was present.  Respiratory events: The home study revealed a presence of 3 obstructive apneas and 7 mixed and central apneas. There were 30 hypopneas resulting in a combined apnea/hypopnea index [AHI] of 6 events per hour.  AHI was 6.9 per hour supine, - per hour prone, 5.9 per hour on left side, and 1.1 per hour on right side.   Pattern: Excluding events " noted above, respiratory rate and pattern was Normal.    Position: Percent of time spent: supine - 79.2%, prone - 0%, on left - 7.6%, on right - 13.2%.    Heart Rate: By pulse oximetry normal rate was noted.     Assessment:   Borderline/Mild obstructive sleep apnea.  Sleep associated hypoxemia was present.    Recommendations:  Consider auto-CPAP at 5-15 cmH2O, oral appliance therapy or confirmatory diagnostic PSG given borderline AHI.  Suggest optimizing sleep hygiene and avoiding sleep deprivation.  Weight management.    Diagnosis Code(s): Obstructive Sleep Apnea G47.33    Parker Baird MD, August 2, 2017   Diplomate, American Board of Internal Medicine, Sleep Medicine

## 2017-08-10 ENCOUNTER — OFFICE VISIT (OUTPATIENT)
Dept: SLEEP MEDICINE | Facility: CLINIC | Age: 32
End: 2017-08-10
Payer: COMMERCIAL

## 2017-08-10 VITALS
OXYGEN SATURATION: 99 % | WEIGHT: 213.2 LBS | RESPIRATION RATE: 16 BRPM | BODY MASS INDEX: 30.52 KG/M2 | SYSTOLIC BLOOD PRESSURE: 130 MMHG | HEIGHT: 70 IN | DIASTOLIC BLOOD PRESSURE: 80 MMHG | HEART RATE: 83 BPM

## 2017-08-10 DIAGNOSIS — G47.33 OSA (OBSTRUCTIVE SLEEP APNEA): ICD-10-CM

## 2017-08-10 DIAGNOSIS — G47.10 EXCESSIVE SLEEPINESS: Primary | ICD-10-CM

## 2017-08-10 PROCEDURE — 99213 OFFICE O/P EST LOW 20 MIN: CPT | Performed by: INTERNAL MEDICINE

## 2017-08-10 NOTE — MR AVS SNAPSHOT
After Visit Summary   8/10/2017    Aydin Jang    MRN: [id number]           Patient Information     Date Of Birth          1985        Visit Information        Provider Department      8/10/2017 10:00 AM Parker Baird MD [facility]        Today's Diagnoses     Excessive sleepiness    -  1    RACHNA (obstructive sleep apnea)          Care Instructions    Below is a list of dental appliance resources recommended by [facility]   If you wish to choose your own dental sleep dentist, you may identify a provider close to you: [email or web address]  [facility]   Sleep Medicine [facility]   Luis Hearn DDS, [facility]  [address] [place] [address] [address]  [place] [address]   Appointments: [phone]  Fax: [phone]   Email: [email or web address]   Dental [facility] Oral Surgery Clinic   SERVANDO Sharif DDS   [address]   [place] [address]   Appointments: [phone]   Website: [email or web address]    Snoring and Sleep Apnea   Dental Treatment Center   JYOTI Miranda DDS  [address], [address]   [place] [address]   Appointments: [phone]   Fax: [phone]   Email: [email or web address]  Website: [email or web address]     [facility]   Office 1   Misael Warren DDS - [DME Medicare]  [address], [address]   [place] [address]  Appointments: [phone]  Fax: [phone]  Website: MaPS    [facility]   Office 2  Misael Warren DDS - [DME Medicare]  [address], [address]  [place] [address]  Appointments: [phone]  Fax: [phone]  Website: MaPS    [facility]  Rafi Song DDS  [address] [place]  [address]  Appointments: [phone]    [facility]   [place] Office   Carlo Chahal DDS   [address]   [place] [address]   Appointments: [phone]   Fax: [phone]   Website: SAW Instrument     [facility]   [place] Office   Matt Mayen DDS, MS - [DME Medicare]  [facility]   [address], [address]   [place] [address]   Appointments: [phone]   Fax: [phone]   Website: [employer]     [facility]  Augustus Devine DMD, MSD - [DME Medicare]  [address], [address]  [place] [address]  Appointments [phone]  Fax: [phone]  Website: [employer]Dimension [employer]    [facility]   [place] Office   Vanessa Cantrell DDS, PhD, MS   [facility]   [address], [address]   [place] [address]   Appointments: [phone]   Fax: [phone]   Website: Qlue     [facility]   [place] Office   Vanessa Cantrell DDS, PhD, MS   [facility] [place]   [address]   [place] [address]   Appointments: [phone]   Fax: [phone]   Website: Qlue     [facility]  Yvette Millan DDS  [facility]  [address] [place] [address]  Appointments: [phone]   Fax: [phone]    Your BMI is Body mass index is 30.59 kg/(m^2).  Weight management is a personal decision.  If you are interested in exploring weight loss strategies, the following discussion covers the approaches that may be successful. Body mass index (BMI) is one way to tell whether you are at a healthy weight, overweight, or obese. It measures your weight in relation to your height.  A BMI of 18.5 to 24.9 is in the healthy range. A person with a BMI of 25 to 29.9 is considered overweight, and someone with a BMI of 30 or greater is  considered obese. More than two-thirds of American adults are considered overweight or obese.  Being overweight or obese increases the risk for further weight gain. Excess weight may lead to heart disease and diabetes.  Creating and following plans for healthy eating and physical activity may help you improve your health.  Weight control is part of healthy lifestyle and includes exercise, emotional health, and healthy eating habits. Careful eating habits lifelong are the mainstay of weight control. Though there are significant health benefits from weight loss, long-term weight loss with diet alone may be very difficult to achieve- studies show long-term success with dietary management in less than 10% of people. Attaining a healthy weight may be especially difficult to achieve in those with severe obesity. In some cases, medications, devices and surgical management might be considered.  What can you do?  If you are overweight or obese and are interested in methods for weight loss, you should discuss this with your provider.     Consider reducing daily calorie intake by 500 calories.     Keep a food journal.     Avoiding skipping meals, consider cutting portions instead.    Diet combined with exercise helps maintain muscle while optimizing fat loss. Strength training is particularly important for building and maintaining muscle mass. Exercise helps reduce stress, increase energy, and improves fitness. Increasing exercise without diet control, however, may not burn enough calories to loose weight.       Start walking three days a week 10-20 minutes at a time    Work towards walking thirty minutes five days a week     Eventually, increase the speed of your walking for 1-2 minutes at time    In addition, we recommend that you review healthy lifestyles and methods for weight loss available through the National Institutes of Health patient information sites:  http://win.niddk.nih.gov/publications/index.htm    And look  into health and wellness programs that may be available through your health insurance provider, employer, local [facility] center, or laura club.    Weight management plan: Patient was referred to their PCP to discuss a diet and exercise plan.            Follow-ups after your visit        Additional Services     SLEEP DENTAL REFERRAL       Dental appliance resources recommended by [facility]     Below is a list of dental appliance resources recommended by [facility]   If you wish to choose your own dental sleep dentist, you may identify a provider close to you: http://[email or web address]    [facility]   [facility]   Luis Hearn DDS, [facility]  [address] [place] [address] [address]  [place] [address]   Appointments: [phone]  Fax: [phone]   Email: [email or web address]   [facility]ental [facility] Oral Surgery Clinic   Perez Gaspar, SERVANDO Chahal DDS   [address]   [place] [address]   Appointments: [phone]   Website: [email or web address]/[email or web address]    Snoring and Sleep Apnea   Dental Treatment Center   JYOTI Miranda DDS  [address]   [place] [address]   Appointments: [phone]   Fax: [phone]   Email: [email or web address]  Website: [employer]     [facility]   Office 1   Misael Warren DDS - [DME Medicare]  [address] [place] [address], [address]   [place] [address]  Appointments: [phone]  Fax: [phone]  Website: View and Chew    [facility]   Office 2  Misael Warren DDS - [DME Medicare]  [address]  [place] [address]  Appointments: [phone]  Fax: [phone]  Website: View [employer] Chew    [facility]  Rafi Song DDS  [address] [place]  [address]  Appointments: [phone]    [facility]   [place] Office   Carlo Chahal DDS   [address]   [place] [address]   Appointments: [phone]   Fax: [phone]   Website: LoveThatFit     [facility]   [place] Office   Matt Mayen DDS, MS - [DME Medicare]  [facility]   [address], [address]   [place] [address]   Appointments: [phone]   Fax: [phone]   Website: LoveThatFit     [facility]  Augustus Devine DMD, MSD - [DME Medicare]  [address], [address]  [place] [address]  Appointments [phone]  Fax: [phone]  Website: [employer]    [facility]   [place] Office   Vanessa Cantrell DDS, PhD, [facility]   [address]   [place] [address]   Appointments: [phone]   Fax: [phone]   Website: In[employer]asc [employer]     [facility]   [place] Office   Vanessa Cantrell DDS, PhD, MS   [facility]   [address], [address]   [place] [address]   Appointments: [phone]   Fax: [phone]   Website: [employer]     [facility]  Yvette Millan DDS  [facility]  [address] [place] [address]  Appointments: [phone]   Fax: [phone]    If you wish to choose your own dental sleep dentist, you may identify a provider close to you: [email or web address]      AHI: 6 + excessive daytime sleepiness PSG DATE: 7/31/2017     Return to clinic prn for Home Sleep Test to confirm adequacy of Treatment (mild disease).    Other information regarding this referral: Mandibular repositioning appliance for RACHNA. If your insurance asks for a CPT code, it is .    Please be aware that coverage of these services is subject to the terms and limitations of  "your health insurance plan.  Call member services at your health plan with any benefit or coverage questions.      Please bring the following to your appointment:    >>   List of current medications   >>   This referral request   >>   Any documents/labs given to you for this referral                  Who to contact     If you have questions or need follow up information about today's clinic visit or your schedule please contact [facility] directly at [phone].  Normal or non-critical lab and imaging results will be communicated to you by Benkyo Playerhart, letter or phone within 4 business days after the clinic has received the results. If you do not hear from us within 7 days, please contact the clinic through Libratot or phone. If you have a critical or abnormal lab result, we will notify you by phone as soon as possible.  Submit refill requests through Seriously or call your pharmacy and they will forward the refill request to us. Please allow 3 business days for your refill to be completed.          Additional Information About Your Visit        Benkyo PlayerharKinder[employer] Information     Seriously gives you secure access to your electronic health record. If you see a primary care provider, you can also send messages to your care team and make appointments. If you have questions, please call your primary care clinic.  If you do not have a primary care provider, please call [phone] and they will assist you.        Care EveryWhere ID     This is your Care EveryWhere ID. This could be used by other organizations to access your [facility] medical records  [id number]        Your Vitals Were     Pulse Respirations Height Pulse Oximetry BMI (Body Mass Index)       83 16 1.778 m (5' 10\") 99% 30.59 kg/m2        Blood Pressure from Last 3 Encounters:   08/10/17 130/80   04/19/17 131/86   04/05/17 133/82    Weight from Last 3 Encounters:   08/10/17 96.7 kg (213 lb 3.2 oz)   04/19/17 93.9 kg (207 lb)   04/05/17 93.8 kg (206 lb 12.8 " oz)              We Performed the Following     SLEEP DENTAL REFERRAL        Primary Care Provider Office Phone #    [facility] [phone]       [address] [place] [address]        Equal Access to Services     TJ HERNANDEZ : Anton Christiansen, wacecile pereiraadaha, qacristata kaalmada mariorimatimothy, waxmiriam telma bravomacario babbadonaylianet merlos. So [facility] [phone].    ATENCIÓN: Si habla español, tiene a gunn disposición servicios gratuitos de asistencia lingüística. Llame al [phone].    We comply with applicable federal civil rights laws and [place] laws. We do not discriminate on the basis of race, color, national origin, age, disability sex, sexual orientation or gender identity.            Thank you!     Thank you for choosing [facility]  for your care. Our goal is always to provide you with excellent care. Hearing back from our patients is one way we can continue to improve our services. Please take a few minutes to complete the written survey that you may receive in the mail after your visit with us. Thank you!             Your Updated Medication List - Protect others around you: Learn how to safely use, store and throw away your medicines at www.disposemymeds.org.          This list is accurate as of: 8/10/17 10:31 AM.  Always use your most recent med list.                   Brand Name Dispense Instructions for use Diagnosis    ascorbic acid 250 MG Chew chewable tablet    vitamin C     Take 250 mg by mouth as needed (3 times a week)

## 2017-08-10 NOTE — NURSING NOTE
"Chief Complaint   Patient presents with     Study Results     psg f/u       Initial /80  Pulse 83  Resp 16  Ht 1.778 m (5' 10\")  Wt 96.7 kg (213 lb 3.2 oz)  SpO2 99%  BMI 30.59 kg/m2 Estimated body mass index is 30.59 kg/(m^2) as calculated from the following:    Height as of this encounter: 1.778 m (5' 10\").    Weight as of this encounter: 96.7 kg (213 lb 3.2 oz).  Medication Reconciliation: complete     Suzie Morton      "

## 2017-08-10 NOTE — PATIENT INSTRUCTIONS
Below is a list of dental appliance resources recommended by [facility]   If you wish to choose your own dental sleep dentist, you may identify a provider close to you: [email or web address]  [facility]   Sleep Medicine [facility]   Luis Hearn DDS, MS   [facility]  [address] [place] [address] [address]  [place] [address]   Appointments: [phone]  Fax: [phone]   Email: [email or web address]   Dental and Oral Surgery Clinic   Perez Gaspar, SERVANDO Chahal DDS   [address]   [place] [address]   Appointments: [phone]   Website: [email or web address]/[email or web address]    Snoring and Sleep Apnea   Dental Treatment Center   JYOTI Miranda DDS  [address]   [place] [address]   Appointments: [phone]   Fax: [phone]   Email: [email or web address]  Website: Workday     [facility]   Office 1   Misael Warren DDS - [DME Medicare]  [address]   [place] [address]  Appointments: [phone]  Fax: [phone]  Website: Ninite    [facility]   Office 2  Misael Wraren DDS - [DME Medicare]  [address]  [place] [address]  Appointments: [phone]  Fax: [phone]  Website: Ninite    [facility]   [facility]  Rafi Song DDS  [address] [place] [address]  Appointments: [phone]    [facility]   [place] Office   Carlo Chahal DDS   [facility]   [address]   [place] [address]   Appointments: [phone]   Fax: [phone]   Website: [facility]     [facility]   [facility]   Matt Mayen DDS, MS - [DME Medicare]  [address]   [place] [address]   Appointments: [phone]   Fax: [phone]   Website: Amerip[employer]ime     [facility]lynn Devine, DMD, MSD - [[facility]]  [address]  [place] [address]  Appointments [phone]  Fax: [phone]  Website: ZilikoyoNanoCor [employer]    [facility]   [place] Office   Vanessa Canterll DDS, PhD, [facility]   [address]   [place] [address]   Appointments: [phone]   Fax: [phone]   Website: Flipaste     [facility]   [place] Office   Vanessa Cantrell DDS, PhD, MS   [facility] [place]   [address] [address]   [place] [address]   Appointments: [phone]   Fax: [phone]   Website: Flipaste     [facility]  Yvette Millan DDS  [facility]  [address] [place] [address]  Appointments: [phone]   Fax: [phone]    Your BMI is Body mass index is 30.59 kg/(m^2).  Weight management is a personal decision.  If you are interested in exploring weight loss strategies, the following discussion covers the approaches that may be successful. Body mass index (BMI) is one way to tell whether you are at a healthy weight, overweight, or obese. It measures your weight in relation to your height.  A BMI of 18.5 to 24.9 is in the healthy range. A person with a BMI of 25 to 29.9 is considered overweight, and someone with a BMI of 30 or greater is considered obese. More than two-thirds of American adults are considered overweight or obese.  Being overweight or obese increases the risk for further weight gain. Excess weight may lead to heart disease and diabetes.  Creating and following plans for healthy eating and physical activity may help you improve your health.  Weight control is part of healthy lifestyle and includes exercise, emotional health, and healthy eating habits.  Careful eating habits lifelong are the mainstay of weight control. Though there are significant health benefits from weight loss, long-term weight loss with diet alone may be very difficult to achieve- studies show long-term success with dietary management in less than 10% of people. Attaining a healthy weight may be especially difficult to achieve in those with severe obesity. In some cases, medications, devices and surgical management might be considered.  What can you do?  If you are overweight or obese and are interested in methods for weight loss, you should discuss this with your provider.     Consider reducing daily calorie intake by 500 calories.     Keep a food journal.     Avoiding skipping meals, consider cutting portions instead.    Diet combined with exercise helps maintain muscle while optimizing fat loss. Strength training is particularly important for building and maintaining muscle mass. Exercise helps reduce stress, increase energy, and improves fitness. Increasing exercise without diet control, however, may not burn enough calories to loose weight.       Start walking three days a week 10-20 minutes at a time    Work towards walking thirty minutes five days a week     Eventually, increase the speed of your walking for 1-2 minutes at time    In addition, we recommend that you review healthy lifestyles and methods for weight loss available through the National Institutes of Health patient information sites:  http://win.niddk.nih.gov/publications/index.htm    And look into health and wellness programs that may be available through your health insurance provider, employer, local community center, or laura club.    Weight management plan: Patient was referred to their PCP to discuss a diet and exercise plan.

## 2017-08-10 NOTE — PROGRESS NOTES
"  Chief Complaint   Patient presents with     Study Results     psg f/u       Aydin Jang is a 31 year old male who returns to [place] [facility] for review of sleep testing results. He presented with symptoms suggestive of obstructive sleep apnea.    Estimated body mass index is 30.59 kg/(m^2) as calculated from the following:    Height as of this encounter: 1.778 m (5' 10\").    Weight as of this encounter: 96.7 kg (213 lb 3.2 oz).  Total score - Worton: 2 (4/19/2017 12:00 PM)  StopBang Total Score: 3 (4/19/2017  1:00 PM)    Home Sleep Apnea Testing - 8/9/17: 213 lbs 3.2 oz: AHI 6/hr. Supine AHI 6.9/hr.   Oxygen Jenaro of 82%.  Baseline 94.3%.  Sp02 =< 88% for 1.2 minutes  He slept on his back (79.2%), prone (0%), left (7.6) and right (13.2%) sides.   Analysis time: 397.7 minutes.     Signal quality of Oxymeter 100% Good  Nasal Cannula 100% Good  RIP belts 100% Good.     Aydin Jang reports that he slept Good .     During this visit, we reviewed the summary of the study and the hypnogram including tracings of position, event distribution, oximetry and heart rate.    Reviewed by team: Allergies  Meds       Reviewed by provider:          Problem List:  Patient Active Problem List    Diagnosis Date Noted     RACHNA (obstructive sleep apnea) 08/02/2017     Priority: Medium     7/31/2017 - Home Sleep Apnea Testing - AHI 6/hr; Supine AHI 6.9/hr; SpO2 <= 88% for 1.2 minutes.        CARDIOVASCULAR SCREENING; LDL GOAL LESS THAN 160 10/31/2010     Priority: Medium        /80  Pulse 83  Resp 16  Ht 1.778 m (5' 10\")  Wt 96.7 kg (213 lb 3.2 oz)  SpO2 99%  BMI 30.59 kg/m2    Impression/Plan:    ICD-10-CM    1. Excessive sleepiness G47.10 SLEEP DENTAL REFERRAL   2. RACHNA (obstructive sleep apnea) G47.33 SLEEP DENTAL REFERRAL       Mild Obstructive Sleep Apnea.   Sleep associated hypoxemia was not present.     Treatment options discussed today including auto-CPAP at 5-15 cmH2O or oral appliance " therapy.  Elected treatment with oral appliance therapy.    He will follow up with me as needed.    Fifteen minutes spent with patient, all of which were spent face-to-face counseling, consulting, coordinating plan of care.      Parker Baird MD    CC:  Clinic, [facility], (only for reference, does not automatically route).

## 2018-08-16 ENCOUNTER — OFFICE VISIT (OUTPATIENT)
Dept: FAMILY MEDICINE | Facility: CLINIC | Age: 33
End: 2018-08-16
Payer: COMMERCIAL

## 2018-08-16 VITALS
WEIGHT: 229 LBS | BODY MASS INDEX: 32.78 KG/M2 | SYSTOLIC BLOOD PRESSURE: 120 MMHG | HEART RATE: 64 BPM | TEMPERATURE: 98.3 F | HEIGHT: 70 IN | DIASTOLIC BLOOD PRESSURE: 92 MMHG

## 2018-08-16 DIAGNOSIS — K13.70 ABNORMAL MOUTH FINDING: ICD-10-CM

## 2018-08-16 DIAGNOSIS — L72.9 SCALP CYST: Primary | ICD-10-CM

## 2018-08-16 DIAGNOSIS — R03.0 ELEVATED BLOOD PRESSURE READING WITHOUT DIAGNOSIS OF HYPERTENSION: ICD-10-CM

## 2018-08-16 PROCEDURE — 99214 OFFICE O/P EST MOD 30 MIN: CPT | Performed by: PHYSICIAN ASSISTANT

## 2018-08-16 NOTE — MR AVS SNAPSHOT
After Visit Summary   8/16/2018    Aydin Jang    MRN: [id number]           Patient Information     Date Of Birth          1985        Visit Information        Provider Department      8/16/2018 2:00 PM Belia Pederson PA-C [facility]        Today's Diagnoses     Scalp cyst    -  1    Abnormal mouth finding           Follow-ups after your visit        Additional Services     OTOLARYNGOLOGY REFERRAL       Your provider has referred you to: [facility]: [facility] [phone]   [email or web address]    Please be aware that coverage of these services is subject to the terms and limitations of your health insurance plan.  Call member services at your health plan with any benefit or coverage questions.      Please bring the following with you to your appointment:    (1) Any X-Rays, CTs or MRIs which have been performed.  Contact the facility where they were done to arrange for  prior to your scheduled appointment.   (2) List of current medications  (3) This referral request   (4) Any documents/labs given to you for this referral                  Who to contact     If you have questions or need follow up information about today's clinic visit or your schedule please contact [facility] directly at [phone].  Normal or non-critical lab and imaging results will be communicated to you by MyChart, letter or phone within 4 business days after the clinic has received the results. If you do not hear from us within 7 days, please contact the clinic through MyChart or phone. If you have a critical or abnormal lab result, we will notify you by phone as soon as possible.  Submit refill requests through Certain or call your pharmacy and they will forward the refill request to us. Please allow 3 business days for your refill to be completed.          Additional Information About Your Visit        MyChart Information   "   [employer] gives you secure access to your electronic health record. If you see a primary care provider, you can also send messages to your care team and make appointments. If you have questions, please call your primary care clinic.  If you do not have a primary care provider, please call [phone] and they will assist you.        Care EveryWhere ID     This is your Care EveryWhere ID. This could be used by other organizations to access your [place] medical records  [id number]        Your Vitals Were     Pulse Temperature Height BMI (Body Mass Index)          64 98.3  F (36.8  C) (Oral) 5' 9.57\" (1.767 m) 33.27 kg/m2         Blood Pressure from Last 3 Encounters:   08/16/18 (!) 120/92   08/10/17 130/80   04/19/17 131/86    Weight from Last 3 Encounters:   08/16/18 229 lb (103.9 kg)   08/10/17 213 lb 3.2 oz (96.7 kg)   04/19/17 207 lb (93.9 kg)              We Performed the Following     OTOLARYNGOLOGY REFERRAL        Primary Care Provider Office Phone # Fax #    Belia Pederson PA-C [phone]       [address] [facility] [address]        Equal Access to Services     TJ HERNANDEZ : Hadii aad ku hadasho Soomaali, waaxda luqadaha, qaybta kaalmada adeegyada, waxay idiin haylibian mario khtana lajoe merlos. So [facility] [phone].    ATENCIÓN: Si habla español, tiene a gunn disposición servicios gratuitos de asistencia lingüística. Llame al [phone].    We comply with applicable federal civil rights laws and [place] laws. We do not discriminate on the basis of race, color, national origin, age, disability, sex, sexual orientation, or gender identity.            Thank you!     Thank you for choosing [facility]  for your care. Our goal is always to provide you with excellent care. Hearing back from our patients is one way we can continue to improve our services. Please take a few minutes to complete the written survey that you may receive in the mail after your visit " with us. Thank you!             Your Updated Medication List - Protect others around you: Learn how to safely use, store and throw away your medicines at www.disposemymeds.org.          This list is accurate as of 8/16/18  2:27 PM.  Always use your most recent med list.                   Brand Name Dispense Instructions for use Diagnosis    ascorbic acid 250 MG Chew chewable tablet    vitamin C     Take 250 mg by mouth as needed (3 times a week)

## 2018-08-16 NOTE — PROGRESS NOTES
SUBJECTIVE:   Aydin Jang is a 32 year old male who presents to clinic today for the following health issues:      Concern - Lump on Head  Onset: 2 years    Description:   Pt has a bump on the back left side of his head. He has been seen before for this and would just like to get it checked out. He said it is a little tender when he sleeps and it feels like it is getting larger. Pt stated it was not a constant pain, it was a throbbing pain. He noticed it when he was doing a lot of head movement.     Intensity: mild    Progression of Symptoms:  same    Accompanying Signs & Symptoms:      Previous history of similar problem:       Precipitating factors:   Worsened by: head movement    Alleviating factors:  Improved by: none    Therapies Tried and outcome: none    It might have gotten bigger. Now with a throbbing pain over the weekend with it. Haven't noticed it much this week. No recent URI symptoms. No pain with palpation, but when it does hurt it throbs. Not intense, just annoying, stops after a few seconds.     Patient also would like a referral to ENT. He has been referred in the past but never scheduled. He is able to force air through his mouth and have it make a noise in his nose. He would like to discuss fixing this.     Problem list and histories reviewed & adjusted, as indicated.  Additional history: as documented    Patient Active Problem List   Diagnosis     CARDIOVASCULAR SCREENING; LDL GOAL LESS THAN 160     RACHNA (obstructive sleep apnea)     Past Surgical History:   Procedure Laterality Date     C NONSPECIFIC PROCEDURE  ? 1999    L wrist cyst removal     EYE SURGERY  07/15/16    PRK both eyes       Social History   Substance Use Topics     Smoking status: Never Smoker     Smokeless tobacco: Never Used     Alcohol use Yes      Comment: 10 drinks a week      Family History   Problem Relation Age of Onset     Obesity Father      GASTROINTESTINAL DISEASE Father      Lipids Father      Diabetes  "Maternal Grandmother      Hypertension Paternal Grandmother      Lipids Paternal Grandmother      Obesity Paternal Grandmother            Reviewed and updated as needed this visit by clinical staff  Tobacco  Allergies  Meds  Problems  Med Hx  Surg Hx  Fam Hx  Soc Hx        Reviewed and updated as needed this visit by Provider  Allergies  Meds  Problems         ROS:  Constitutional, HEENT, cardiovascular, pulmonary, gi and gu systems are negative, except as otherwise noted.    OBJECTIVE:     BP (!) 137/91 (BP Location: Right arm, Patient Position: Chair, Cuff Size: Adult Large)  Pulse 64  Temp 98.3  F (36.8  C) (Oral)  Ht 5' 9.57\" (1.767 m)  Wt 229 lb (103.9 kg)  BMI 33.27 kg/m2  Body mass index is 33.27 kg/(m^2).  GENERAL: healthy, alert and no distress  HENT: normal cephalic/atraumatic- on the left posterior scalp there is a 4cm x3cm cystic like lesion just under the scalp. No pain with palpation.   Neck: no lymphadenopathy.     Diagnostic Test Results:  none     ASSESSMENT/PLAN:       ICD-10-CM    1. Scalp cyst L72.9    2. Abnormal mouth finding K13.70 OTOLARYNGOLOGY REFERRAL   3. Elevated blood pressure reading without diagnosis of hypertension R03.0    Patient elects to monitor the cyst for now. Discussed imaging and possible excision but he would prefer to monitor this.   BP was elevated today. Will recheck at his physical later this year.     FUTURE APPOINTMENTS:       - Follow-up for annual visit or as needed    Belia Pederson PA-C  [facility]  "

## 2018-10-23 ENCOUNTER — OFFICE VISIT (OUTPATIENT)
Dept: OTOLARYNGOLOGY | Facility: CLINIC | Age: 33
End: 2018-10-23
Payer: COMMERCIAL

## 2018-10-23 VITALS
WEIGHT: 226 LBS | RESPIRATION RATE: 16 BRPM | TEMPERATURE: 97.4 F | OXYGEN SATURATION: 100 % | DIASTOLIC BLOOD PRESSURE: 90 MMHG | BODY MASS INDEX: 32.35 KG/M2 | HEIGHT: 70 IN | SYSTOLIC BLOOD PRESSURE: 137 MMHG | HEART RATE: 74 BPM

## 2018-10-23 DIAGNOSIS — D17.30 LIPOMA OF SKIN AND SUBCUTANEOUS TISSUE: Primary | ICD-10-CM

## 2018-10-23 PROCEDURE — 99243 OFF/OP CNSLTJ NEW/EST LOW 30: CPT | Performed by: OTOLARYNGOLOGY

## 2018-10-23 ASSESSMENT — PAIN SCALES - GENERAL: PAINLEVEL: NO PAIN (0)

## 2018-10-23 NOTE — PROGRESS NOTES
I am seeing this patient in consultation for abnormal mouth finding and scalp mass at the request of the provider Belia Pederson.      Chief Complaint - oral fistula    History of Present Illness - Aydin Jang is a 32 year old male who presents with a concern of a fistula from his mouth to his nose. He sucks and can get air throw into his mouth from what he presumes is his nose. The patient has noticed for approximately 4th grade. It has been intermittent. It isn't painful. No bleeding. Nonsmoker, and no history of chewing tobacco. Gets some sinusitis. Denies food or liquid into nose from mouth.    Has a cyst of skull. Noted for 2 years. Not changing. No pain. Annoys him.     Past Medical History -   Patient Active Problem List   Diagnosis     CARDIOVASCULAR SCREENING; LDL GOAL LESS THAN 160     RACHNA (obstructive sleep apnea)       Current Medications -   Current Outpatient Prescriptions:      ascorbic acid (VITAMIN C) 250 MG CHEW, Take 250 mg by mouth as needed (3 times a week) , Disp: , Rfl:     Allergies - No Known Allergies    Social History -   Social History     Social History     Marital status: Single     Spouse name: N/A     Number of children: N/A     Years of education: N/A     Social History Main Topics     Smoking status: Never Smoker     Smokeless tobacco: Never Used     Alcohol use Yes      Comment: 10 drinks a week      Drug use: No     Sexual activity: No     Other Topics Concern     None     Social History Narrative       Family History -   Family History   Problem Relation Age of Onset     Obesity Father      GASTROINTESTINAL DISEASE Father      Lipids Father      Diabetes Maternal Grandmother      Hypertension Paternal Grandmother      Lipids Paternal Grandmother      Obesity Paternal Grandmother        Review of Systems - As per HPI and PMHx, otherwise 7 system review of the head and neck negative.    Physical Exam  /90  Pulse 74  Temp 97.4  F (36.3  C) (Oral)  Resp 16  Ht 1.767 m  "(5' 9.57\")  Wt 102.5 kg (226 lb)  SpO2 100%  BMI 32.83 kg/m2  General - The patient is in no distress. Alert and oriented x3, answers questions and cooperates with examination appropriately.   Voice and Breathing - The patient was breathing comfortably without the use of accessory muscles. There was no wheezing, stridor, or stertor.  The patients voice was clear and strong.  Eyes - Extraocular movements intact. Sclera were not icteric or injected, conjunctiva were pink and moist.  Neurologic - Cranial nerves II-XII are grossly intact. Specifically, the facial nerve is intact, House-Brackmann grade 1 of 6.   Mouth - Examination of the oral cavity showed no obvious fistula. I'm not sure if he is sucking through his teeth. No purulence. No masses or lesions.   Neck -  Left suboccipital scalp has a 4 cm soft mass, mobile, likely a lipoma. No pain. Palpation of the occipital, submental, submandibular, internal jugular chain, and supraclavicular nodes did not demonstrate any abnormal lymph nodes or masses. The parotid glands were without masses. The trachea was midline.  Nose - septum midline. No masses or purulence. No fistula.     A/P - Aydin Jang is a 32 year old male with a habitual mouth sucking noise he makes.  He feels like he is sucking air through his hard palate anteriorly.  However I do not see any obvious fistula.  I think he is probably actually sucking air through his teeth.  If he has a small fistula it does not seem to cause him any problems and he should stop the habit of sucking to make the noise.  He also has a scalp mass that is likely lipoma.  This can be removed but can also be observed.  It does not seem to be growing or causing pain and so he is okay with observing at this time.      Grupo Madsen MD  Otolaryngology  [facility]    "

## 2018-10-23 NOTE — MR AVS SNAPSHOT
After Visit Summary   10/23/2018    Aydin Jang    MRN: [id number]           Patient Information     Date Of Birth          1985        Visit Information        Provider Department      10/23/2018 1:45 PM Grupo Madsen MD [facility]        Today's Diagnoses     Lipoma of skin and subcutaneous tissue    -  1       Follow-ups after your visit        Your next 10 appointments already scheduled     Nov 02, 2018 11:30 AM CDT   Return Sleep Patient with Parker Baird MD   [facility])    [address] [address] [phone]              Who to contact     If you have questions or need follow up information about today's clinic visit or your schedule please contact [facility] directly at [phone].  Normal or non-critical lab and imaging results will be communicated to you by MyChart, letter or phone within 4 business days after the clinic has received the results. If you do not hear from us within 7 days, please contact the clinic through MyChart or phone. If you have a critical or abnormal lab result, we will notify you by phone as soon as possible.  Submit refill requests through [employer] or call your pharmacy and they will forward the refill request to us. Please allow 3 business days for your refill to be completed.          Additional Information About Your Visit        MyChart Information     [employer] gives you secure access to your electronic health record. If you see a primary care provider, you can also send messages to your care team and make appointments. If you have questions, please call your primary care clinic.  If you do not have a primary care provider, please call [phone] and they will assist you.        Care EveryWhere ID     This is your Care EveryWhere ID. This could be used by other organizations to access your [facility]  "records  [id number]        Your Vitals Were     Pulse Temperature Respirations Height Pulse Oximetry BMI (Body Mass Index)    74 97.4  F (36.3  C) (Oral) 16 1.767 m (5' 9.57\") 100% 32.83 kg/m2       Blood Pressure from Last 3 Encounters:   10/23/18 137/90   08/16/18 (!) 120/92   08/10/17 130/80    Weight from Last 3 Encounters:   10/23/18 102.5 kg (226 lb)   08/16/18 103.9 kg (229 lb)   08/10/17 96.7 kg (213 lb 3.2 oz)              Today, you had the following     No orders found for display       Primary Care Provider Office Phone # Fax #    Belia Pederson PA-C [phone]       [address] [facility] [address]        Equal Access to Services     [facility]: Hadii aby conteh hadasho Soomaali, waaxda luqadaha, qaybta kaalmada adeegyada, savita hollis hayjanene izquierdo . So [facility] [phone].    ATENCIÓN: Si syla español, tiene a gunn disposición servicios gratuitos de asistencia lingüística. Llame al [phone].    We comply with applicable federal civil rights laws and [place] laws. We do not discriminate on the basis of race, color, national origin, age, disability, sex, sexual orientation, or gender identity.            Thank you!     Thank you for choosing [facility]  for your care. Our goal is always to provide you with excellent care. Hearing back from our patients is one way we can continue to improve our services. Please take a few minutes to complete the written survey that you may receive in the mail after your visit with us. Thank you!             Your Updated Medication List - Protect others around you: Learn how to safely use, store and throw away your medicines at www.disposemymeds.org.          This list is accurate as of 10/23/18  5:15 PM.  Always use your most recent med list.                   Brand Name Dispense Instructions for use Diagnosis    ascorbic acid 250 MG Chew chewable tablet    vitamin C     Take 250 mg by mouth as needed (3 times " a week)

## 2018-10-23 NOTE — LETTER
10/23/2018         RE: Aydin Jang  [address] [address]        Dear Colleague,    Thank you for referring your patient, Aydin Jang, to the [facility]. Please see a copy of my visit note below.    I am seeing this patient in consultation for abnormal mouth finding and scalp mass at the request of the provider Belia Pederson.      Chief Complaint - oral fistula    History of Present Illness - Aydin Jang is a 32 year old male who presents with a concern of a fistula from his mouth to his nose. He sucks and can get air throw into his mouth from what he presumes is his nose. The patient has noticed for approximately 4th grade. It has been intermittent. It isn't painful. No bleeding. Nonsmoker, and no history of chewing tobacco. Gets some sinusitis. Denies food or liquid into nose from mouth.    Has a cyst of skull. Noted for 2 years. Not changing. No pain. Annoys him.     Past Medical History -   Patient Active Problem List   Diagnosis     CARDIOVASCULAR SCREENING; LDL GOAL LESS THAN 160     RACHNA (obstructive sleep apnea)       Current Medications -   Current Outpatient Prescriptions:      ascorbic acid (VITAMIN C) 250 MG CHEW, Take 250 mg by mouth as needed (3 times a week) , Disp: , Rfl:     Allergies - No Known Allergies    Social History -   Social History     Social History     Marital status: Single     Spouse name: N/A     Number of children: N/A     Years of education: N/A     Social History Main Topics     Smoking status: Never Smoker     Smokeless tobacco: Never Used     Alcohol use Yes      Comment: 10 drinks a week      Drug use: No     Sexual activity: No     Other Topics Concern     None     Social History Narrative       Family History -   Family History   Problem Relation Age of Onset     Obesity Father      GASTROINTESTINAL DISEASE Father      Lipids Father      Diabetes Maternal Grandmother      Hypertension Paternal Grandmother      Lipids  "Paternal Grandmother      Obesity Paternal Grandmother        Review of Systems - As per HPI and PMHx, otherwise 7 system review of the head and neck negative.    Physical Exam  /90  Pulse 74  Temp 97.4  F (36.3  C) (Oral)  Resp 16  Ht 1.767 m (5' 9.57\")  Wt 102.5 kg (226 lb)  SpO2 100%  BMI 32.83 kg/m2  General - The patient is in no distress. Alert and oriented x3, answers questions and cooperates with examination appropriately.   Voice and Breathing - The patient was breathing comfortably without the use of accessory muscles. There was no wheezing, stridor, or stertor.  The patients voice was clear and strong.  Eyes - Extraocular movements intact. Sclera were not icteric or injected, conjunctiva were pink and moist.  Neurologic - Cranial nerves II-XII are grossly intact. Specifically, the facial nerve is intact, House-Brackmann grade 1 of 6.   Mouth - Examination of the oral cavity showed no obvious fistula. I'm not sure if he is sucking through his teeth. No purulence. No masses or lesions.   Neck -  Left suboccipital scalp has a 4 cm soft mass, mobile, likely a lipoma. No pain. Palpation of the occipital, submental, submandibular, internal jugular chain, and supraclavicular nodes did not demonstrate any abnormal lymph nodes or masses. The parotid glands were without masses. The trachea was midline.  Nose - septum midline. No masses or purulence. No fistula.     A/P - Aydin Jang is a 32 year old male with a habitual mouth sucking noise he makes.  He feels like he is sucking air through his hard palate anteriorly.  However I do not see any obvious fistula.  I think he is probably actually sucking air through his teeth.  If he has a small fistula it does not seem to cause him any problems and he should stop the habit of sucking to make the noise.  He also has a scalp mass that is likely lipoma.  This can be removed but can also be observed.  It does not seem to be growing or causing pain and " so he is okay with observing at this time.      Grupo Madsen MD  Otolaryngology  [facility]      Again, thank you for allowing me to participate in the care of your patient.        Sincerely,        Grupo Madsen MD

## 2018-11-02 ENCOUNTER — OFFICE VISIT (OUTPATIENT)
Dept: SLEEP MEDICINE | Facility: CLINIC | Age: 33
End: 2018-11-02
Payer: COMMERCIAL

## 2018-11-02 VITALS
DIASTOLIC BLOOD PRESSURE: 85 MMHG | RESPIRATION RATE: 16 BRPM | TEMPERATURE: 98.3 F | BODY MASS INDEX: 31.5 KG/M2 | HEART RATE: 72 BPM | WEIGHT: 220 LBS | OXYGEN SATURATION: 99 % | SYSTOLIC BLOOD PRESSURE: 139 MMHG | HEIGHT: 70 IN

## 2018-11-02 DIAGNOSIS — G47.33 OSA (OBSTRUCTIVE SLEEP APNEA): ICD-10-CM

## 2018-11-02 DIAGNOSIS — G47.10 EXCESSIVE SLEEPINESS: Primary | ICD-10-CM

## 2018-11-02 PROCEDURE — 99212 OFFICE O/P EST SF 10 MIN: CPT | Performed by: INTERNAL MEDICINE

## 2018-11-02 NOTE — NURSING NOTE
"Chief Complaint   Patient presents with     Sleep Problem     Lost dental referral, [place]       Initial /85  Pulse 72  Temp 98.3  F (36.8  C) (Oral)  Resp 16  Ht 1.772 m (5' 9.75\")  Wt 99.8 kg (220 lb)  SpO2 99%  BMI 31.79 kg/m2 Estimated body mass index is 31.79 kg/(m^2) as calculated from the following:    Height as of this encounter: 1.772 m (5' 9.75\").    Weight as of this encounter: 99.8 kg (220 lb).    Medication Reconciliation: complete    ESS 1    Palma Woodard MA    "

## 2018-11-02 NOTE — PATIENT INSTRUCTIONS

## 2018-11-02 NOTE — PROGRESS NOTES
Patient with history of mild Obstructive Sleep Apnea with excessive daytime sleepiness.  Wants oral appliance for management of mild Obstructive Sleep Apnea with symptoms.  Referral placed.  Already has appt [facility].  Printed referral, sleep study, and initial consult.  Given mild Obstructive Sleep Apnea, oral appliance is chosen per patient preference over CPAP.  Total time of care was 10 minutes, with > 50% of time spent on counseling and coordination of care.

## 2018-11-02 NOTE — MR AVS SNAPSHOT
After Visit Summary   11/2/2018    yAdin Jang    MRN: [id number]           Patient Information     Date Of Birth          1985        Visit Information        Provider Department      11/2/2018 11:30 AM Parker Baird MD [facility]        Today's Diagnoses     Excessive sleepiness    -  1    RACHNA (obstructive sleep apnea)          Care Instructions      Your BMI is Body mass index is 31.79 kg/(m^2).  Weight management is a personal decision.  If you are interested in exploring weight loss strategies, the following discussion covers the approaches that may be successful. Body mass index (BMI) is one way to tell whether you are at a healthy weight, overweight, or obese. It measures your weight in relation to your height.  A BMI of 18.5 to 24.9 is in the healthy range. A person with a BMI of 25 to 29.9 is considered overweight, and someone with a BMI of 30 or greater is considered obese. More than two-thirds of American adults are considered overweight or obese.  Being overweight or obese increases the risk for further weight gain. Excess weight may lead to heart disease and diabetes.  Creating and following plans for healthy eating and physical activity may help you improve your health.  Weight control is part of healthy lifestyle and includes exercise, emotional health, and healthy eating habits. Careful eating habits lifelong are the mainstay of weight control. Though there are significant health benefits from weight loss, long-term weight loss with diet alone may be very difficult to achieve- studies show long-term success with dietary management in less than 10% of people. Attaining a healthy weight may be especially difficult to achieve in those with severe obesity. In some cases, medications, devices and surgical management might be considered.  What can you do?  If you are overweight or obese and are interested in methods for weight loss, you should  discuss this with your provider.     Consider reducing daily calorie intake by 500 calories.     Keep a food journal.     Avoiding skipping meals, consider cutting portions instead.    Diet combined with exercise helps maintain muscle while optimizing fat loss. Strength training is particularly important for building and maintaining muscle mass. Exercise helps reduce stress, increase energy, and improves fitness. Increasing exercise without diet control, however, may not burn enough calories to loose weight.       Start walking three days a week 10-20 minutes at a time    Work towards walking thirty minutes five days a week     Eventually, increase the speed of your walking for 1-2 minutes at time    In addition, we recommend that you review healthy lifestyles and methods for weight loss available through the National Institutes of Health patient information sites:  http://win.niddk.nih.gov/publications/index.htm    And look into health and wellness programs that may be available through your health insurance provider, employer, local community center, or laura club.    Weight management plan: Patient was referred to their PCP to discuss a diet and exercise plan.              Follow-ups after your visit        Additional Services     SLEEP DENTAL REFERRAL       If you wish to choose your own dental sleep dentist, you may identify a provider close to you: [email or web address]    Home Sleep Apnea Testing DATE: 7/31/2017   AHI: 6     Return to clinic prn for Home Sleep Test to confirm adequacy of Treatment (mild so testing is not required).    Other information regarding this referral: Mandibular repositioning appliance for RACHNA. If your insurance asks for a CPT code, it is .    Please be aware that coverage of these services is subject to the terms and limitations of your health insurance plan.  Call member services at your health plan with any benefit or coverage questions.      Please bring  "the following to your appointment:    >>   List of current medications   >>   This referral request   >>   Any documents/labs given to you for this referral                  Who to contact     If you have questions or need follow up information about today's clinic visit or your schedule please contact [facility] directly at [phone].  Normal or non-critical lab and imaging results will be communicated to you by MyChart, letter or phone within 4 business days after the clinic has received the results. If you do not hear from us within 7 days, please contact the clinic through [employer] or phone. If you have a critical or abnormal lab result, we will notify you by phone as soon as possible.  Submit refill requests through [employer] or call your pharmacy and they will forward the refill request to us. Please allow 3 business days for your refill to be completed.          Additional Information About Your Visit        MyChart Information     [employer] gives you secure access to your electronic health record. If you see a primary care provider, you can also send messages to your care team and make appointments. If you have questions, please call your primary care clinic.  If you do not have a primary care provider, please call [phone] and they will assist you.        Care EveryWhere ID     This is your Care EveryWhere ID. This could be used by other organizations to access your [facility] medical records  [id number]        Your Vitals Were     Pulse Temperature Respirations Height Pulse Oximetry BMI (Body Mass Index)    72 98.3  F (36.8  C) (Oral) 16 1.772 m (5' 9.75\") 99% 31.79 kg/m2       Blood Pressure from Last 3 Encounters:   11/02/18 139/85   10/23/18 137/90   08/16/18 (!) 120/92    Weight from Last 3 Encounters:   11/02/18 99.8 kg (220 lb)   10/23/18 102.5 kg (226 lb)   08/16/18 103.9 kg (229 lb)              We Performed the Following     SLEEP DENTAL REFERRAL        Primary Care Provider " Office Phone # Fax #    Belia Pederson PA-C [phone]       [address] [place] [address]        Equal Access to Services     TJ HERNANDEZ : Hadii aad ku hadalejoo Sohollandali, waaxda luqadaha, qaybta kaalmada adeleiada, savita hernandez diazkarthikeyan arrietalianet merlos. So [facility] [phone].    ATENCIÓN: Si habla español, tiene a gunn disposición servicios gratuitos de asistencia lingüística. Llame al [phone].    We comply with applicable federal civil rights laws and [place] laws. We do not discriminate on the basis of race, color, national origin, age, disability, sex, sexual orientation, or gender identity.            Thank you!     Thank you for choosing [facility]  for your care. Our goal is always to provide you with excellent care. Hearing back from our patients is one way we can continue to improve our services. Please take a few minutes to complete the written survey that you may receive in the mail after your visit with us. Thank you!             Your Updated Medication List - Protect others around you: Learn how to safely use, store and throw away your medicines at www.disposemymeds.org.          This list is accurate as of 11/2/18 11:56 AM.  Always use your most recent med list.                   Brand Name Dispense Instructions for use Diagnosis    ascorbic acid 250 MG Chew chewable tablet    vitamin C     Take 250 mg by mouth as needed (3 times a week)

## 2018-11-19 ENCOUNTER — TRANSFERRED RECORDS (OUTPATIENT)
Dept: HEALTH INFORMATION MANAGEMENT | Facility: CLINIC | Age: 33
End: 2018-11-19

## 2019-07-24 ENCOUNTER — OFFICE VISIT (OUTPATIENT)
Dept: FAMILY MEDICINE | Facility: CLINIC | Age: 34
End: 2019-07-24
Payer: COMMERCIAL

## 2019-07-24 VITALS
SYSTOLIC BLOOD PRESSURE: 145 MMHG | OXYGEN SATURATION: 100 % | HEART RATE: 77 BPM | DIASTOLIC BLOOD PRESSURE: 95 MMHG | BODY MASS INDEX: 32.37 KG/M2 | WEIGHT: 224 LBS

## 2019-07-24 DIAGNOSIS — M72.2 PLANTAR FASCIITIS: Primary | ICD-10-CM

## 2019-07-24 DIAGNOSIS — R03.0 ELEVATED BLOOD PRESSURE READING WITHOUT DIAGNOSIS OF HYPERTENSION: ICD-10-CM

## 2019-07-24 PROCEDURE — 99213 OFFICE O/P EST LOW 20 MIN: CPT | Performed by: INTERNAL MEDICINE

## 2019-07-24 RX ORDER — ACETAMINOPHEN 325 MG/1
325-650 TABLET ORAL EVERY 6 HOURS PRN
COMMUNITY

## 2019-07-24 NOTE — PROGRESS NOTES
Subjective     Aydin Jang is a 33 year old male who presents to clinic today for the following health issues:    HPI   Concern - Foot pain  Onset: Yesterday     Description:   Left foot; arch area to heal area causing a lot of pain with weight    Intensity: mild, moderate    Progression of Symptoms:  worsening    Accompanying Signs & Symptoms:  None    Previous history of similar problem:   YES    Precipitating factors:   Worsened by: Movement; weight baring     Alleviating factors:  Improved by: None    Therapies Tried and outcome: Tylenol      Patient Active Problem List   Diagnosis     CARDIOVASCULAR SCREENING; LDL GOAL LESS THAN 160     RACHNA (obstructive sleep apnea)     Past Surgical History:   Procedure Laterality Date     C NONSPECIFIC PROCEDURE  ? 1999    L wrist cyst removal     EYE SURGERY  07/15/16    PRK both eyes       Social History     Tobacco Use     Smoking status: Never Smoker     Smokeless tobacco: Never Used   Substance Use Topics     Alcohol use: Yes     Comment: 10 drinks a week      Family History   Problem Relation Age of Onset     Obesity Father      Gastrointestinal Disease Father      Lipids Father      Diabetes Maternal Grandmother      Hypertension Paternal Grandmother      Lipids Paternal Grandmother      Obesity Paternal Grandmother          Current Outpatient Medications   Medication Sig Dispense Refill     acetaminophen (TYLENOL) 325 MG tablet Take 325-650 mg by mouth every 6 hours as needed for mild pain       ascorbic acid (VITAMIN C) 250 MG CHEW Take 250 mg by mouth as needed (3 times a week)        No Known Allergies  Recent Labs   Lab Test 07/21/16  0718   LDL 64   HDL 45   TRIG 167*   CR 0.80   GFRESTIMATED >90  Non  GFR Calc     GFRESTBLACK >90   GFR Calc     POTASSIUM 3.9          Reviewed and updated as needed this visit by Provider         Review of Systems   ROS COMP: Constitutional, HEENT, cardiovascular, pulmonary, gi and gu systems  "are negative, except as otherwise noted.      Objective    BP (!) 145/95 (BP Location: Right arm, Patient Position: Chair, Cuff Size: Adult Regular)   Pulse 77   Wt 101.6 kg (224 lb)   SpO2 100%   BMI 32.37 kg/m    Body mass index is 32.37 kg/m .  Physical Exam   GENERAL: healthy, alert and no distress  EYES: Eyes grossly normal to inspection, PERRL and conjunctivae and sclerae normal  HENT: ear canals and TM's normal, nose and mouth without ulcers or lesions  NECK: no adenopathy, no asymmetry, masses, or scars and thyroid normal to palpation  RESP: lungs clear to auscultation - no rales, rhonchi or wheezes  CV: regular rate and rhythm, normal S1 S2, no S3 or S4, no murmur, click or rub, no peripheral edema and peripheral pulses strong  ABDOMEN: soft, nontender, no hepatosplenomegaly, no masses and bowel sounds normal  MS: no gross musculoskeletal defects noted, no edema  SKIN: no suspicious lesions or rashes  NEURO: Normal strength and tone, mentation intact and speech normal  BACK: no CVA tenderness, no paralumbar tenderness  PSYCH: mentation appears normal, affect normal/bright  LYMPH: no cervical, supraclavicular, axillary, or inguinal adenopathy    Diagnostic Test Results:  Labs reviewed in Epic  Results for orders placed or performed in visit on 07/31/17   HST-Home Sleep Apnea Test    Narrative    Parker Baird MD     8/2/2017  4:33 PM  HOME SLEEP STUDY INTERPRETATION    Patient: Aydin Jang  MRN: [id number]  YOB: 1985  Study Date: 7/31/2017  Referring Provider: [facility], [facility];   Ordering Provider: Parker Baird MD     Indications for Home Study: Aydin Jang is a 31 year old   male with a history of obesity who presents with symptoms   suggestive of obstructive sleep apnea.    Estimated body mass index is 30.13 kg/(m^2) as calculated from   the following:    Height as of 4/19/17: 1.765 m (5' 9.5\").    Weight as of 4/19/17: 93.9 kg (207 " lb).  Total score - [place]: 2 (4/19/2017 12:00 PM)  StopBang Total Score: 3 (4/19/2017  1:00 PM)    Data: A full night home sleep study was performed recording the   standard physiologic parameters including body position,   movement, sound, nasal pressure, thermal oral airflow, chest and   abdominal movements with respiratory inductance plethysmography,   and oxygen saturation by pulse oximetry. Pulse rate was estimated   by oximetry recording. This study was considered adequate based   on > 4 hours of quality oximetry and respiratory recording. As   specified by the AASM Manual for the Scoring of Sleep and   Associated events, version 2.3, Rule VIII.D 1B, 4% oxygen   desaturation scoring for hypopneas is used as a standard of care   on all home sleep apnea testing.    Analysis Time:  397.7 minutes    Respiration:   Sleep Associated Hypoxemia: sustained hypoxemia was not present.   Baseline oxygen saturation was 94.3%.  Time with saturation less   than or equal to 88% was 1.2 minutes. The lowest oxygen   saturation was 82%.   Snoring: Snoring was present.  Respiratory events: The home study revealed a presence of 3   obstructive apneas and 7 mixed and central apneas. There were 30   hypopneas resulting in a combined apnea/hypopnea index [AHI] of 6   events per hour.  AHI was 6.9 per hour supine, - per hour prone,   5.9 per hour on left side, and 1.1 per hour on right side.   Pattern: Excluding events noted above, respiratory rate and   pattern was Normal.    Position: Percent of time spent: supine - 79.2%, prone - 0%, on   left - 7.6%, on right - 13.2%.    Heart Rate: By pulse oximetry normal rate was noted.     Assessment:   Borderline/Mild obstructive sleep apnea.  Sleep associated hypoxemia was present.    Recommendations:  Consider auto-CPAP at 5-15 cmH2O, oral appliance therapy or   confirmatory diagnostic PSG given borderline AHI.  Suggest optimizing sleep hygiene and avoiding sleep deprivation.  Weight  management.    Diagnosis Code(s): Obstructive Sleep Apnea G47.33    Parker Baird MD, August 2, 2017   Diplomate, American Board of Internal Medicine, Sleep Medicine             Assessment & Plan       ICD-10-CM    1. Plantar fasciitis M72.2    2. Elevated blood pressure reading without diagnosis of hypertension R03.0         Re-check next visit.  Exercise and lower salt diet needed    Work on weight loss  Regular exercise    No follow-ups on file.    David Welch MD  [facility]

## 2020-11-14 ENCOUNTER — HEALTH MAINTENANCE LETTER (OUTPATIENT)
Age: 35
End: 2020-11-14

## 2021-09-12 ENCOUNTER — HEALTH MAINTENANCE LETTER (OUTPATIENT)
Age: 36
End: 2021-09-12

## 2022-01-02 ENCOUNTER — HEALTH MAINTENANCE LETTER (OUTPATIENT)
Age: 37
End: 2022-01-02

## 2022-08-03 NOTE — TELEPHONE ENCOUNTER
DIAGNOSIS: Foot pain across top of left foot   APPOINTMENT DATE: 8.8.22   NOTES STATUS DETAILS   MEDICATION LIST Internal    XRAYS (IMAGES & REPORTS) Internal 7.5.16 L foot

## 2022-08-08 ENCOUNTER — OFFICE VISIT (OUTPATIENT)
Dept: ORTHOPEDICS | Facility: CLINIC | Age: 37
End: 2022-08-08
Payer: COMMERCIAL

## 2022-08-08 ENCOUNTER — PRE VISIT (OUTPATIENT)
Dept: ORTHOPEDICS | Facility: CLINIC | Age: 37
End: 2022-08-08

## 2022-08-08 DIAGNOSIS — S93.522A TURF TOE OF LEFT FOOT: Primary | ICD-10-CM

## 2022-08-08 PROCEDURE — 99203 OFFICE O/P NEW LOW 30 MIN: CPT | Performed by: FAMILY MEDICINE

## 2022-08-08 NOTE — PROGRESS NOTES
Sports Medicine Clinic Visit    PCP: Belia Pederson Giovanni Jang is a 36 year old male who is seen  as self referral presenting with Left MTP/ forefoot    Injury: Pt was reroofing a shed over the 4th of july and began noticing pain two days after that.  He was working on the roof in running shoes.  No particular injury incident on the roof that the recalls.  Works as a  at a seated job.  Pain is located along the medial aspect of his left MTP.    Location of Pain: left  forefoot  Duration of Pain: 1 month(s)  Rating of Pain: 4/10  Pain is better with: ice, elevation, rest  Pain is worse with: walking  Additional Features: swelling previously  Treatment so far consists of: ice, elevation, rest, ibuprofen, tylenol  Prior History of related problems: previous plantar fascitis    There were no vitals taken for this visit.        Patient evaluated by primary provider 7/2019 for left-sided heel discomfort, Plantar fasciitis.  Small piece of glass removed from left heel in an office procedure by primary provider 7/5/2016.    XR FOOT LT G/E 3 VW   7/5/2016 11:14 AM     History: heel pain, Superficial foreign body, left foot, initial  encounter     Comparison: None     Findings: No fracture identified. No significant soft tissue swelling.  Lisfranc joint is congruent. Joint spaces are preserved.                                                                      Impression: No acute bone abnormality identified in the left foot.     LOIS LEE        PMH:  Past Medical History:   Diagnosis Date     NO ACTIVE PROBLEMS        Active problem list:  Patient Active Problem List   Diagnosis     CARDIOVASCULAR SCREENING; LDL GOAL LESS THAN 160     RACHNA (obstructive sleep apnea)       FH:  Family History   Problem Relation Age of Onset     Obesity Father      Gastrointestinal Disease Father      Lipids Father      Diabetes Maternal Grandmother      Hypertension Paternal Grandmother       Lipids Paternal Grandmother      Obesity Paternal Grandmother        SH:  Social History     Socioeconomic History     Marital status: Single     Spouse name: Not on file     Number of children: Not on file     Years of education: Not on file     Highest education level: Not on file   Occupational History     Not on file   Tobacco Use     Smoking status: Never Smoker     Smokeless tobacco: Never Used   Substance and Sexual Activity     Alcohol use: Yes     Comment: 10 drinks a week      Drug use: No     Sexual activity: Never   Other Topics Concern     Parent/sibling w/ CABG, MI or angioplasty before 65F 55M? Not Asked   Social History Narrative     Not on file     Social Determinants of Health     Financial Resource Strain: Not on file   Food Insecurity: Not on file   Transportation Needs: Not on file   Physical Activity: Not on file   Stress: Not on file   Social Connections: Not on file   Intimate Partner Violence: Not on file   Housing Stability: Not on file       MEDS:  See EMR, reviewed  ALL:  See EMR, reviewed    REVIEW OF SYSTEMS:  CONSTITUTIONAL:NEGATIVE for fever, chills, change in weight  INTEGUMENTARY/SKIN: NEGATIVE for worrisome rashes, moles or lesions  EYES: NEGATIVE for vision changes or irritation  ENT/MOUTH: NEGATIVE for ear, mouth and throat problems  RESP:NEGATIVE for significant cough or SOB  BREAST: NEGATIVE for masses, tenderness or discharge  CV: NEGATIVE for chest pain, palpitations or peripheral edema  GI: NEGATIVE for nausea, abdominal pain, heartburn, or change in bowel habits  :NEGATIVE for frequency, dysuria, or hematuria  :NEGATIVE for frequency, dysuria, or hematuria  NEURO: NEGATIVE for weakness, dizziness or paresthesias  ENDOCRINE: NEGATIVE for temperature intolerance, skin/hair changes  HEME/ALLERGY/IMMUNE: NEGATIVE for bleeding problems  PSYCHIATRIC: NEGATIVE for changes in mood or affect        Objective: There is no acute effusion at the MTP joint.  It is not red.  He is  tender along the medial aspect of the MTP.  There is no significant bunion deformity.  He is nontender along the medial or lateral sesamoid.  I can flex and extend the toe through a full range of motion passively without pain.  He is nontender along the volar aspects of the heads of the second, third, fourth digits.  Squeeze test of the metatarsal heads is negative.    We reviewed x-rays that show no significant degenerative change at the MTP.  Some mild narrowing at the MTP joint that seems unchanged from an x-ray in 2016.    Assessment right-sided MTP soft tissue discomfort, suspect soft tissue MTP discomfort versus turf toe symptoms    Plan: He was given a cam walker boot for use over the next 2 weeks.  He can remove it for sleep and for gentle range of motion.  Ice.  Tylenol as needed.            DME FITTING    Relevant Diagnosis: Turf Toe, Left  Short walking boot  was fit on patient's Left foot.     Person(s) involved in teaching:   Patient    Brace was applied in standard Manner:  Yes  Brace fit well:  Yes  Patient reports brace to fit comfortably:  Yes    Education:   Patient shown self application and removal of brace: Yes  Patient shown how to adjust brace fit, if necessary: Yes  Patient educated on billing and return policy: Yes  Patient confirmed understanding when and how to contact clinic with concerns: Yes    Pritesh Erazo ATC on 8/8/2022 at 4:28 PM

## 2022-08-08 NOTE — LETTER
8/8/2022      RE: Aydin Jang  [address]  [facility] [address]     Dear Colleague,    Thank you for referring your patient, Aydin Jang, to the [facility] [place]. Please see a copy of my visit note below.    Sports Medicine Clinic Visit    PCP: Belia Pederson    Aydin Jang is a 36 year old male who is seen  as self referral presenting with Left MTP/ forefoot    Injury: Pt was reroofing a shed over the 4th of july and began noticing pain two days after that.  He was working on the roof in running shoes.  No particular injury incident on the roof that the recalls.  Works as a  at a seated job.  Pain is located along the medial aspect of his left MTP.    Location of Pain: left  forefoot  Duration of Pain: 1 month(s)  Rating of Pain: 4/10  Pain is better with: ice, elevation, rest  Pain is worse with: walking  Additional Features: swelling previously  Treatment so far consists of: ice, elevation, rest, ibuprofen, tylenol  Prior History of related problems: previous plantar fascitis    There were no vitals taken for this visit.        Patient evaluated by primary provider 7/2019 for left-sided heel discomfort, Plantar fasciitis.  Small piece of glass removed from left heel in an office procedure by primary provider 7/5/2016.    XR FOOT LT G/E 3 VW   7/5/2016 11:14 AM     History: heel pain, Superficial foreign body, left foot, initial  encounter     Comparison: None     Findings: No fracture identified. No significant soft tissue swelling.  Lisfranc joint is congruent. Joint spaces are preserved.                                                                      Impression: No acute bone abnormality identified in the left foot.     LOIS LEE        PMH:  Past Medical History:   Diagnosis Date     NO ACTIVE PROBLEMS        Active problem list:  Patient Active Problem List   Diagnosis      CARDIOVASCULAR SCREENING; LDL GOAL LESS THAN 160     RACHNA (obstructive sleep apnea)       FH:  Family History   Problem Relation Age of Onset     Obesity Father      Gastrointestinal Disease Father      Lipids Father      Diabetes Maternal Grandmother      Hypertension Paternal Grandmother      Lipids Paternal Grandmother      Obesity Paternal Grandmother        SH:  Social History     Socioeconomic History     Marital status: Single     Spouse name: Not on file     Number of children: Not on file     Years of education: Not on file     Highest education level: Not on file   Occupational History     Not on file   Tobacco Use     Smoking status: Never Smoker     Smokeless tobacco: Never Used   Substance and Sexual Activity     Alcohol use: Yes     Comment: 10 drinks a week      Drug use: No     Sexual activity: Never   Other Topics Concern     Parent/sibling w/ CABG, MI or angioplasty before 65F 55M? Not Asked   Social History Narrative     Not on file     Social Determinants of Health     Financial Resource Strain: Not on file   Food Insecurity: Not on file   Transportation Needs: Not on file   Physical Activity: Not on file   Stress: Not on file   Social Connections: Not on file   Intimate Partner Violence: Not on file   Housing Stability: Not on file       MEDS:  See EMR, reviewed  ALL:  See EMR, reviewed    REVIEW OF SYSTEMS:  CONSTITUTIONAL:NEGATIVE for fever, chills, change in weight  INTEGUMENTARY/SKIN: NEGATIVE for worrisome rashes, moles or lesions  EYES: NEGATIVE for vision changes or irritation  ENT/MOUTH: NEGATIVE for ear, mouth and throat problems  RESP:NEGATIVE for significant cough or SOB  BREAST: NEGATIVE for masses, tenderness or discharge  CV: NEGATIVE for chest pain, palpitations or peripheral edema  GI: NEGATIVE for nausea, abdominal pain, heartburn, or change in bowel habits  :NEGATIVE for frequency, dysuria, or hematuria  :NEGATIVE for frequency, dysuria, or hematuria  NEURO: NEGATIVE for  weakness, dizziness or paresthesias  ENDOCRINE: NEGATIVE for temperature intolerance, skin/hair changes  HEME/ALLERGY/IMMUNE: NEGATIVE for bleeding problems  PSYCHIATRIC: NEGATIVE for changes in mood or affect        Objective: There is no acute effusion at the MTP joint.  It is not red.  He is tender along the medial aspect of the MTP.  There is no significant bunion deformity.  He is nontender along the medial or lateral sesamoid.  I can flex and extend the toe through a full range of motion passively without pain.  He is nontender along the volar aspects of the heads of the second, third, fourth digits.  Squeeze test of the metatarsal heads is negative.    We reviewed x-rays that show no significant degenerative change at the MTP.  Some mild narrowing at the MTP joint that seems unchanged from an x-ray in 2016.    Assessment right-sided MTP soft tissue discomfort, suspect soft tissue MTP discomfort versus turf toe symptoms    Plan: He was given a cam walker boot for use over the next 2 weeks.  He can remove it for sleep and for gentle range of motion.  Ice.  Tylenol as needed.            DME FITTING    Relevant Diagnosis: Turf Toe, Left  Short walking boot  was fit on patient's Left foot.     Person(s) involved in teaching:   Patient    Brace was applied in standard Manner:  Yes  Brace fit well:  Yes  Patient reports brace to fit comfortably:  Yes    Education:   Patient shown self application and removal of brace: Yes  Patient shown how to adjust brace fit, if necessary: Yes  Patient educated on billing and return policy: Yes  Patient confirmed understanding when and how to contact clinic with concerns: Yes    Pritesh Erazo ATC on 8/8/2022 at 4:28 PM          Again, thank you for allowing me to participate in the care of your patient.      Sincerely,    Calixto Bynum MD

## 2022-11-19 ENCOUNTER — HEALTH MAINTENANCE LETTER (OUTPATIENT)
Age: 37
End: 2022-11-19

## 2023-02-06 ENCOUNTER — ANCILLARY PROCEDURE (OUTPATIENT)
Dept: CT IMAGING | Facility: CLINIC | Age: 38
End: 2023-02-06
Attending: INTERNAL MEDICINE
Payer: COMMERCIAL

## 2023-02-06 ENCOUNTER — OFFICE VISIT (OUTPATIENT)
Dept: FAMILY MEDICINE | Facility: CLINIC | Age: 38
End: 2023-02-06
Payer: COMMERCIAL

## 2023-02-06 VITALS
SYSTOLIC BLOOD PRESSURE: 134 MMHG | DIASTOLIC BLOOD PRESSURE: 82 MMHG | RESPIRATION RATE: 18 BRPM | TEMPERATURE: 98 F | HEIGHT: 70 IN | BODY MASS INDEX: 32.91 KG/M2 | WEIGHT: 229.9 LBS | OXYGEN SATURATION: 98 % | HEART RATE: 102 BPM

## 2023-02-06 DIAGNOSIS — R10.32 LEFT LOWER QUADRANT PAIN: Primary | ICD-10-CM

## 2023-02-06 DIAGNOSIS — R10.32 LEFT LOWER QUADRANT PAIN: ICD-10-CM

## 2023-02-06 LAB
ALBUMIN SERPL BCG-MCNC: 4.6 G/DL (ref 3.5–5.2)
ALP SERPL-CCNC: 70 U/L (ref 40–129)
ALT SERPL W P-5'-P-CCNC: 28 U/L (ref 10–50)
ANION GAP SERPL CALCULATED.3IONS-SCNC: 8 MMOL/L (ref 7–15)
AST SERPL W P-5'-P-CCNC: 28 U/L (ref 10–50)
BASOPHILS # BLD AUTO: 0 10E3/UL (ref 0–0.2)
BASOPHILS NFR BLD AUTO: 0 %
BILIRUB SERPL-MCNC: 0.3 MG/DL
BUN SERPL-MCNC: 10.6 MG/DL (ref 6–20)
CALCIUM SERPL-MCNC: 9.8 MG/DL (ref 8.6–10)
CHLORIDE SERPL-SCNC: 103 MMOL/L (ref 98–107)
CREAT SERPL-MCNC: 0.81 MG/DL (ref 0.67–1.17)
DEPRECATED HCO3 PLAS-SCNC: 30 MMOL/L (ref 22–29)
EOSINOPHIL # BLD AUTO: 0 10E3/UL (ref 0–0.7)
EOSINOPHIL NFR BLD AUTO: 1 %
ERYTHROCYTE [DISTWIDTH] IN BLOOD BY AUTOMATED COUNT: 11.8 % (ref 10–15)
GFR SERPL CREATININE-BSD FRML MDRD: >90 ML/MIN/1.73M2
GLUCOSE SERPL-MCNC: 90 MG/DL (ref 70–99)
HCT VFR BLD AUTO: 45.3 % (ref 40–53)
HGB BLD-MCNC: 15.2 G/DL (ref 13.3–17.7)
IMM GRANULOCYTES # BLD: 0 10E3/UL
IMM GRANULOCYTES NFR BLD: 0 %
LIPASE SERPL-CCNC: 30 U/L (ref 13–60)
LYMPHOCYTES # BLD AUTO: 1.1 10E3/UL (ref 0.8–5.3)
LYMPHOCYTES NFR BLD AUTO: 22 %
MCH RBC QN AUTO: 28.5 PG (ref 26.5–33)
MCHC RBC AUTO-ENTMCNC: 33.6 G/DL (ref 31.5–36.5)
MCV RBC AUTO: 85 FL (ref 78–100)
MONOCYTES # BLD AUTO: 0.4 10E3/UL (ref 0–1.3)
MONOCYTES NFR BLD AUTO: 8 %
NEUTROPHILS # BLD AUTO: 3.6 10E3/UL (ref 1.6–8.3)
NEUTROPHILS NFR BLD AUTO: 69 %
PLATELET # BLD AUTO: 256 10E3/UL (ref 150–450)
POTASSIUM SERPL-SCNC: 4.5 MMOL/L (ref 3.4–5.3)
PROT SERPL-MCNC: 7.8 G/DL (ref 6.4–8.3)
RBC # BLD AUTO: 5.33 10E6/UL (ref 4.4–5.9)
SODIUM SERPL-SCNC: 141 MMOL/L (ref 136–145)
WBC # BLD AUTO: 5.1 10E3/UL (ref 4–11)

## 2023-02-06 PROCEDURE — 80053 COMPREHEN METABOLIC PANEL: CPT | Performed by: INTERNAL MEDICINE

## 2023-02-06 PROCEDURE — 74177 CT ABD & PELVIS W/CONTRAST: CPT

## 2023-02-06 PROCEDURE — 83690 ASSAY OF LIPASE: CPT | Performed by: INTERNAL MEDICINE

## 2023-02-06 PROCEDURE — 36415 COLL VENOUS BLD VENIPUNCTURE: CPT | Performed by: INTERNAL MEDICINE

## 2023-02-06 PROCEDURE — 250N000011 HC RX IP 250 OP 636: Performed by: INTERNAL MEDICINE

## 2023-02-06 PROCEDURE — 99203 OFFICE O/P NEW LOW 30 MIN: CPT | Performed by: INTERNAL MEDICINE

## 2023-02-06 PROCEDURE — 85025 COMPLETE CBC W/AUTO DIFF WBC: CPT | Performed by: INTERNAL MEDICINE

## 2023-02-06 RX ORDER — IOPAMIDOL 755 MG/ML
100 INJECTION, SOLUTION INTRAVASCULAR ONCE
Status: COMPLETED | OUTPATIENT
Start: 2023-02-06 | End: 2023-02-06

## 2023-02-06 RX ADMIN — IOPAMIDOL 100 ML: 755 INJECTION, SOLUTION INTRAVENOUS at 15:25

## 2023-02-06 ASSESSMENT — PAIN SCALES - GENERAL: PAINLEVEL: NO PAIN (1)

## 2023-02-06 NOTE — RESULT ENCOUNTER NOTE
The following letter pertains to your most recent diagnostic tests:    Again, as we discussed by phone, the CT scan shows that your abdominal pain is likely from a harmless self-limited condition known as epiploic appendagitis.      Sincerely,    Dr. Grady

## 2023-02-06 NOTE — PROGRESS NOTES
"  Assessment & Plan     Left lower quadrant pain  DDx includes diverticular disease, renal stone, hernia,   Check CT to further evaluate   - Comprehensive metabolic panel (BMP + Alb, Alk Phos, ALT, AST, Total. Bili, TP); Future  - CBC with platelets and differential; Future  - Lipase; Future  - CT Abdomen Pelvis w Contrast; Future      21 minutes spent on the date of the encounter doing chart review, history and exam, documentation and further activities per the note          Return today (on 2/6/2023) for CT abdomen .    Celio Grady MD  [facility] THERESA Pino is a 37 year old, presenting for the following health issues:  Gastrointestinal Problem (Patient coming in left sided abdominal pain x 1 week.  Pain eased up during the weekend.  )      HPI       Healthy man with 6 day history of left lower quadrant pain  Not improving  No fevers, no vomiting, no diarrhea, no blood in stools or weight loss     Review of Systems         Objective    /82 (BP Location: Left arm, Patient Position: Sitting, Cuff Size: Adult Large)   Pulse 102   Temp 98  F (36.7  C) (Temporal)   Resp 18   Ht 1.765 m (5' 9.5\")   Wt 104.3 kg (229 lb 14.4 oz)   SpO2 98%   BMI 33.46 kg/m    Body mass index is 33.46 kg/m .  Physical Exam   GENERAL: healthy, alert and no distress  RESP: lungs clear to auscultation - no rales, rhonchi or wheezes  CV: Heart with regular rate and rhythm.   ABDOMEN: Tender left lower quadrant without rebound tenderness or guarding, bowel sounds present, abdomen soft, no masses  :  No hernias, no testicular masses   NEURO: Normal strength and tone, mentation intact and speech normal  PSYCH: mentation appears normal, affect normal/bright                    "

## 2023-02-06 NOTE — RESULT ENCOUNTER NOTE
The following letter pertains to your most recent diagnostic tests:    Good news!  As we discussed by phone, your abdominal pain is caused by a self-limited, not dangerous condition known as epiploic appendagitis.  The laboratory investigations are normal.    Sincerely,    Dr. Grady

## 2023-04-09 ENCOUNTER — HEALTH MAINTENANCE LETTER (OUTPATIENT)
Age: 38
End: 2023-04-09

## 2023-06-09 ENCOUNTER — ANCILLARY PROCEDURE (OUTPATIENT)
Dept: GENERAL RADIOLOGY | Facility: CLINIC | Age: 38
End: 2023-06-09
Attending: PODIATRIST
Payer: COMMERCIAL

## 2023-06-09 ENCOUNTER — OFFICE VISIT (OUTPATIENT)
Dept: PODIATRY | Facility: CLINIC | Age: 38
End: 2023-06-09
Payer: COMMERCIAL

## 2023-06-09 VITALS
HEIGHT: 70 IN | DIASTOLIC BLOOD PRESSURE: 119 MMHG | SYSTOLIC BLOOD PRESSURE: 159 MMHG | BODY MASS INDEX: 32.78 KG/M2 | HEART RATE: 109 BPM | WEIGHT: 229 LBS

## 2023-06-09 DIAGNOSIS — M25.879 IMPINGEMENT OF ANTERIOR PART OF ANKLE: ICD-10-CM

## 2023-06-09 DIAGNOSIS — M10.9 ACUTE GOUT OF RIGHT FOOT, UNSPECIFIED CAUSE: Primary | ICD-10-CM

## 2023-06-09 DIAGNOSIS — M79.671 RIGHT FOOT PAIN: ICD-10-CM

## 2023-06-09 DIAGNOSIS — Q66.71 PES CAVUS OF RIGHT FOOT: ICD-10-CM

## 2023-06-09 LAB — URATE SERPL-MCNC: 7.5 MG/DL (ref 3.4–7)

## 2023-06-09 PROCEDURE — 36415 COLL VENOUS BLD VENIPUNCTURE: CPT | Performed by: PODIATRIST

## 2023-06-09 PROCEDURE — 84550 ASSAY OF BLOOD/URIC ACID: CPT | Performed by: PODIATRIST

## 2023-06-09 PROCEDURE — 73630 X-RAY EXAM OF FOOT: CPT | Mod: TC | Performed by: RADIOLOGY

## 2023-06-09 PROCEDURE — 99204 OFFICE O/P NEW MOD 45 MIN: CPT | Performed by: PODIATRIST

## 2023-06-09 ASSESSMENT — PAIN SCALES - GENERAL: PAINLEVEL: SEVERE PAIN (6)

## 2023-06-09 NOTE — PATIENT INSTRUCTIONS
PATIENT INSTRUCTIONS - Podiatry / Foot & Ankle Surgery        Boot Immobilization:  Large tall aircast   Wear the boot at all times when walking or standing.  Wear the boot for 1-4 weeks or 1 week beyond resolution of pain.  You may remove the boot when at rest for ankle and toe motion & for bathing/showering.  Do not drive in the boot; wear the boot to & from the car, then switch to a stiff soled shoe for driving.  You do not need to sleep in the boot.  Wear compression (ACE bandage or Tubigrip) under the boot to decrease swelling.  Minimal activity in the boot        Diclofenac / Voltaren - 1 pill twice daily x1 week.  Then take a 1 week break.  Repeat as needed.  Take with food & an acid blocker if stomach upset occurs.  Stop all other NSAIDs (aspirin, ibuprofen/Motrin, naproxen/ Aleve).        Uric acid on way out today

## 2023-06-09 NOTE — NURSING NOTE
"Chief Complaint   Patient presents with     Consult     Right foot pain- no injury- went hiking- ice, rest, and taking tylenol       Initial BP (!) 159/119   Pulse 109   Ht 1.765 m (5' 9.5\")   Wt 103.9 kg (229 lb)   BMI 33.33 kg/m   Estimated body mass index is 33.33 kg/m  as calculated from the following:    Height as of this encounter: 1.765 m (5' 9.5\").    Weight as of this encounter: 103.9 kg (229 lb).  Medications and allergies reviewed.      Antonia HEATON MA    "

## 2023-06-09 NOTE — LETTER
6/9/2023         RE: Aydin Jang  [address]  [facility] [address]        Dear Colleague,    Thank you for referring your patient, Aydin Jang, to the [facility]. Please see a copy of my visit note below.    Assessment:      ICD-10-CM    1. Acute gout of right foot, unspecified cause  M10.9 XR Foot Right G/E 3 Views     diclofenac (VOLTAREN) 50 MG EC tablet     Uric acid     Uric acid     Ankle/Foot Bracing Supplies DME Walking Boot; Right; Pneumatic; Tall      2. Pes cavus of right foot  Q66.71 Ankle/Foot Bracing Supplies DME Walking Boot; Right; Pneumatic; Tall      3. Impingement of anterior part of ankle  M25.879            Plan:  Orders Placed This Encounter   Procedures     XR Foot Right G/E 3 Views     Uric acid     Ankle/Foot Bracing Supplies DME Walking Boot; Right; Pneumatic; Tall       Discussed the etiology and treatment of the condition with the patient.  Imaging studies reviewed and discussed with the patient.  Discussed surgical and conservative options.        -NSAID- Rx po  -Immobilization- boot - has.  Tall boot dispensed today.  Ok to discharge once not painful   -Uric acid today.  If elevated, f/u with PCP recommended - 2nd flare        Return:  No follow-ups on file.    Rosita Malloy DPM                Chief Complaint:     Patient presents with:  Consult: Right foot pain- no injury- went hiking- ice, rest, and taking tylenol     right foot pain    HPI:  Aydin Jang is a 37 year old year old male who presents for evaluation of foot pain.      Top of R foot.  No injury        Past Medical & Surgical History:  Past Medical History:   Diagnosis Date     NO ACTIVE PROBLEMS       Past Surgical History:   Procedure Laterality Date     EYE SURGERY  07/15/16    PRK both eyes     ZZC NONSPECIFIC PROCEDURE  ? 1999    L wrist cyst removal      Family History   Problem Relation Age of Onset     Obesity  "Father      Gastrointestinal Disease Father      Lipids Father      Diabetes Maternal Grandmother      Hypertension Paternal Grandmother      Lipids Paternal Grandmother      Obesity Paternal Grandmother         Social History:  ?  History   Smoking Status     Never   Smokeless Tobacco     Never     History   Drug Use No     Social History    Substance and Sexual Activity      Alcohol use: Yes        Comment: 10 drinks a week       Allergies:  ?   No Known Allergies     Medications:    Current Outpatient Medications   Medication     acetaminophen (TYLENOL) 325 MG tablet     diclofenac (VOLTAREN) 50 MG EC tablet     No current facility-administered medications for this visit.       Physical Exam:  ?  Vitals:  BP (!) 159/119   Pulse 109   Ht 1.765 m (5' 9.5\")   Wt 103.9 kg (229 lb)   BMI 33.33 kg/m     General:  WD/WN, in NAD.  A&O x3.  Dermatologic:    Skin is intact, open lesions absent.   Skin texture, turgor is normal.  Vascular:  Pulses palpable.  Digital capillary refill time normal.  Skin temperature is warm, R vs L  Generalized edema- none.  Focal edema- moderate midfoot right.  Neurologic:    Gross sensation normal.  Gait and balance abnormal, antalgic, R.  Musculoskeletal:  Maximal pain to palpation of dorsal midfoot over 2nd, 3rd TMTJ,  right.  mild pain to palpation of sub 2nd MTP right.  No pain to palpation anteromedial ankle jont, b/l  Ankle ROM smooth, nonpainful right. Osseous endpoint to DF b/l    STJ, MTJ ROM full, pain free right.    Muscle strength 5/5  foot and ankle bilateral.    Stance:  RCSP neutral bilateral.  Foot type:  Mild cavus      Imaging:   x-ray independently reviewed and interpreted by myself today.  Weight-bearing views right foot dated 06/09/23, reveal no degeneration, ant osseous impingement of ankle.  Metatarsus adductus and mild cavus    x-ray independently reviewed and interpreted by myself today.  Weight-bearing views left foot dated 2022, reveal similar cavus foot, no " midfoot degeneration, ant ankle osseous impingement                      Again, thank you for allowing me to participate in the care of your patient.        Sincerely,        Rosita Malloy DPM

## 2023-06-09 NOTE — PROGRESS NOTES
Assessment:      ICD-10-CM    1. Acute gout of right foot, unspecified cause  M10.9 XR Foot Right G/E 3 Views     diclofenac (VOLTAREN) 50 MG EC tablet     Uric acid     Uric acid     Ankle/Foot Bracing Supplies DME Walking Boot; Right; Pneumatic; Tall      2. Pes cavus of right foot  Q66.71 Ankle/Foot Bracing Supplies DME Walking Boot; Right; Pneumatic; Tall      3. Impingement of anterior part of ankle  M25.879            Plan:  Orders Placed This Encounter   Procedures     XR Foot Right G/E 3 Views     Uric acid     Ankle/Foot Bracing Supplies DME Walking Boot; Right; Pneumatic; Tall       Discussed the etiology and treatment of the condition with the patient.  Imaging studies reviewed and discussed with the patient.  Discussed surgical and conservative options.        -NSAID- Rx po  -Immobilization- boot - has.  Tall boot dispensed today.  Ok to discharge once not painful   -Uric acid today.  If elevated, f/u with PCP recommended - 2nd flare        Return:  No follow-ups on file.    Rosita Malloy DPM                Chief Complaint:     Patient presents with:  Consult: Right foot pain- no injury- went hiking- ice, rest, and taking tylenol     right foot pain    HPI:  Aydin Jang is a 37 year old year old male who presents for evaluation of foot pain.      Top of R foot.  No injury    No results found for: URIC      Past Medical & Surgical History:  Past Medical History:   Diagnosis Date     NO ACTIVE PROBLEMS       Past Surgical History:   Procedure Laterality Date     EYE SURGERY  07/15/16    PRK both eyes     ZZC NONSPECIFIC PROCEDURE  ? 1999    L wrist cyst removal      Family History   Problem Relation Age of Onset     Obesity Father      Gastrointestinal Disease Father      Lipids Father      Diabetes Maternal Grandmother      Hypertension Paternal Grandmother      Lipids Paternal Grandmother      Obesity Paternal Grandmother         Social History:  ?  History   Smoking Status  "    Never   Smokeless Tobacco     Never     History   Drug Use No     Social History    Substance and Sexual Activity      Alcohol use: Yes        Comment: 10 drinks a week       Allergies:  ?   No Known Allergies     Medications:    Current Outpatient Medications   Medication     acetaminophen (TYLENOL) 325 MG tablet     diclofenac (VOLTAREN) 50 MG EC tablet     No current facility-administered medications for this visit.       Physical Exam:  ?  Vitals:  BP (!) 159/119   Pulse 109   Ht 1.765 m (5' 9.5\")   Wt 103.9 kg (229 lb)   BMI 33.33 kg/m     General:  WD/WN, in NAD.  A&O x3.  Dermatologic:    Skin is intact, open lesions absent.   Skin texture, turgor is normal.  Vascular:  Pulses palpable.  Digital capillary refill time normal.  Skin temperature is warm, R vs L  Generalized edema- none.  Focal edema- moderate midfoot right.  Neurologic:    Gross sensation normal.  Gait and balance abnormal, antalgic, R.  Musculoskeletal:  Maximal pain to palpation of dorsal midfoot over 2nd, 3rd TMTJ,  right.  mild pain to palpation of sub 2nd MTP right.  No pain to palpation anteromedial ankle jont, b/l  Ankle ROM smooth, nonpainful right. Osseous endpoint to DF b/l    STJ, MTJ ROM full, pain free right.    Muscle strength 5/5  foot and ankle bilateral.    Stance:  RCSP neutral bilateral.  Foot type:  Mild cavus      Imaging:   x-ray independently reviewed and interpreted by myself today.  Weight-bearing views right foot dated 06/09/23, reveal no degeneration, ant osseous impingement of ankle.  Metatarsus adductus and mild cavus    x-ray independently reviewed and interpreted by myself today.  Weight-bearing views left foot dated 2022, reveal similar cavus foot, no midfoot degeneration, ant ankle osseous impingement                  "

## 2023-06-10 DIAGNOSIS — M10.9 ACUTE GOUT OF RIGHT FOOT, UNSPECIFIED CAUSE: ICD-10-CM

## 2023-06-11 DIAGNOSIS — M10.9 ACUTE GOUT OF RIGHT FOOT, UNSPECIFIED CAUSE: ICD-10-CM

## 2023-06-12 NOTE — TELEPHONE ENCOUNTER
We have received two refill requests for Diclofenac 50 mg. One from [facility] on [facility], and one from [facility] on [facility]. See other duplicate refill encounter dated 6/10/23.     Unsure if patient has picked up any prescription yet dated 6/9/23 from Dr. Malloy.     Left voicemail asking for a return call.  number was provided: [phone].     JACKIE Mckeon RN

## 2023-06-12 NOTE — TELEPHONE ENCOUNTER
Duplicate refill request. Please see other request also dated 6/10/23. Need to confirm pharmacy location.     JACKIE Mckeon RN

## 2023-06-13 NOTE — TELEPHONE ENCOUNTER
Reason for call:  Patient returned missed call.   OK to leave detailed message.    Cell number on file:    Telephone Information:   Mobile [phone]

## 2023-06-14 NOTE — TELEPHONE ENCOUNTER
Left voicemail informing patient that we have received two refill requests for Diclofenac 50mg from two different [facility]. The prescription originally was sent to the [facility] on [facility]. Asked that he call back to let us know if he is still needing the medication and which [facility] it should be sent to.    number provided: [phone].     JACKIE Mckeon RN

## 2023-06-21 NOTE — TELEPHONE ENCOUNTER
Left 2nd voicemail informing patient that we had tried to contact him last week to confirm which pharmacy he wanted medication sent to. We will assume that has been taken care of unless patient calls back. Scheduling number provided.   Closing encounter.     JACKIE Mckeon RN

## 2024-03-04 ENCOUNTER — OFFICE VISIT (OUTPATIENT)
Dept: FAMILY MEDICINE | Facility: CLINIC | Age: 39
End: 2024-03-04
Payer: COMMERCIAL

## 2024-03-04 ENCOUNTER — TELEPHONE (OUTPATIENT)
Dept: FAMILY MEDICINE | Facility: CLINIC | Age: 39
End: 2024-03-04

## 2024-03-04 VITALS
TEMPERATURE: 98.6 F | HEIGHT: 70 IN | SYSTOLIC BLOOD PRESSURE: 121 MMHG | DIASTOLIC BLOOD PRESSURE: 87 MMHG | RESPIRATION RATE: 17 BRPM | BODY MASS INDEX: 32.61 KG/M2 | HEART RATE: 110 BPM | OXYGEN SATURATION: 99 % | WEIGHT: 227.8 LBS

## 2024-03-04 DIAGNOSIS — J02.9 PHARYNGITIS, UNSPECIFIED ETIOLOGY: Primary | ICD-10-CM

## 2024-03-04 DIAGNOSIS — J30.2 SEASONAL ALLERGIC RHINITIS, UNSPECIFIED TRIGGER: ICD-10-CM

## 2024-03-04 DIAGNOSIS — H69.91 DYSFUNCTION OF RIGHT EUSTACHIAN TUBE: ICD-10-CM

## 2024-03-04 LAB
DEPRECATED S PYO AG THROAT QL EIA: NEGATIVE
FLUAV RNA SPEC QL NAA+PROBE: NEGATIVE
FLUBV RNA RESP QL NAA+PROBE: NEGATIVE
GROUP A STREP BY PCR: NOT DETECTED
RSV RNA SPEC NAA+PROBE: NEGATIVE
SARS-COV-2 RNA RESP QL NAA+PROBE: NEGATIVE

## 2024-03-04 PROCEDURE — 87651 STREP A DNA AMP PROBE: CPT | Performed by: NURSE PRACTITIONER

## 2024-03-04 PROCEDURE — 87637 SARSCOV2&INF A&B&RSV AMP PRB: CPT | Performed by: NURSE PRACTITIONER

## 2024-03-04 PROCEDURE — 99214 OFFICE O/P EST MOD 30 MIN: CPT | Performed by: NURSE PRACTITIONER

## 2024-03-04 RX ORDER — FLUTICASONE PROPIONATE 50 MCG
2 SPRAY, SUSPENSION (ML) NASAL 2 TIMES DAILY
Qty: 16 G | Refills: 0 | Status: SHIPPED | OUTPATIENT
Start: 2024-03-04 | End: 2024-03-18

## 2024-03-04 RX ORDER — FEXOFENADINE HCL AND PSEUDOEPHEDRINE HCL 180; 240 MG/1; MG/1
1 TABLET, EXTENDED RELEASE ORAL DAILY
Qty: 30 TABLET | Refills: 0 | Status: SHIPPED | OUTPATIENT
Start: 2024-03-04 | End: 2024-04-03

## 2024-03-04 ASSESSMENT — PAIN SCALES - GENERAL: PAINLEVEL: SEVERE PAIN (6)

## 2024-03-04 NOTE — RESULT ENCOUNTER NOTE
Results discussed directly with patient while patient was present. Any further details documented in the note.   KONRAD Helton CNP

## 2024-03-04 NOTE — TELEPHONE ENCOUNTER
PA Initiation    Medication: FLUTICASONE PROPIONATE 50 MCG/ACT NA SUSP  Insurance Company: [employer] - Phone [phone] Fax [phone]  Pharmacy Filling the Rx: [id number] - [place], [place] - [address]  Filling Pharmacy Phone: [phone]  Filling Pharmacy Fax:    Start Date: 3/4/2024

## 2024-03-04 NOTE — PROGRESS NOTES
"  Assessment & Plan     Pharyngitis, unspecified etiology  Dysfunction of right eustachian tube  Seasonal allergic rhinitis, unspecified trigger   Submandibular adenopathy, turbinates w/ mild erythema; boggy; right ear serous fluid; no erythema, pain, TM intact no infection; sinuses w/o pain/tenderness on palpations; will treat symptomatically and follow up in 1 week if no improvement will start abo  - Streptococcus A Rapid Screen w/Reflex to PCR - Clinic Collect  - Symptomatic Influenza A/B, RSV, & SARS-CoV2 PCR (COVID-19) Nose  - Group A Streptococcus PCR Throat Swab  - fluticasone (FLONASE) 50 MCG/ACT nasal spray  Dispense: 16 g; Refill: 0  - fexofenadine-pseudoePHEDrine (ALLEGRA-D 24) 180-240 MG 24 hr tablet  Dispense: 30 tablet; Refill: 0                BMI  Estimated body mass index is 32.69 kg/m  as calculated from the following:    Height as of this encounter: 1.778 m (5' 10\").    Weight as of this encounter: 103.3 kg (227 lb 12.8 oz).             Subjective   Alvarez is a 38 year old, presenting for the following health issues:  Sinus Problem      3/4/2024     1:03 PM   Additional Questions   Roomed by Triny ORELLANA         3/4/2024     1:03 PM   Patient Reported Additional Medications   Patient reports taking the following new medications Sudafed     Sinus Problem     History of Present Illness       Reason for visit:  Sinus infection  Symptom onset:  3-7 days ago  Symptoms include:  Headache, sore throat, phlegm, sinus pressure  Symptom intensity:  Moderate  Symptom progression:  Worsening  Had these symptoms before:  Yes  Has tried/received treatment for these symptoms:  Yes  Previous treatment was successful:  Yes  Prior treatment description:  Antibiotics (bacterial) or rest (viral)    He eats 4 or more servings of fruits and vegetables daily.He consumes 0 sweetened beverage(s) daily.He exercises with enough effort to increase his heart rate 9 or less minutes per day.  He exercises with enough effort to " "increase his heart rate 3 or less days per week.   He is taking medications regularly.                     Objective    /87 (BP Location: Right arm, Patient Position: Sitting, Cuff Size: Adult Large)   Pulse 110   Temp 98.6  F (37  C) (Temporal)   Resp 17   Ht 1.778 m (5' 10\")   Wt 103.3 kg (227 lb 12.8 oz)   SpO2 99%   BMI 32.69 kg/m    Body mass index is 32.69 kg/m .  Physical Exam               Signed Electronically by: KONRAD Helton CNP    "

## 2024-03-04 NOTE — TELEPHONE ENCOUNTER
Prior Authorization Retail Medication Request    Medication/Dose: fluticasone (FLONASE) 50 MCG/ACT nasal spray   Diagnosis and ICD code (if different than what is on RX):  Pharyngitis, unspecified etiology - Sig - Route: Spray 2 sprays into both nostrils 2 times daily for 14 days     Insurance   Primary: [facility] OUT OF STATE   Insurance ID:  [id number]     Pharmacy Information (if different than what is on RX)  Name:  [facility]  Phone:  [phone]  Fax:[phone]

## 2024-03-05 NOTE — RESULT ENCOUNTER NOTE
Tho Pino,    Your recent results are normal. Any results slightly above or below the normal range have been evaluated as clinically stable.     Let me know if you have any questions or concerns.    Sincerely,  KONRAD Helton CNP

## 2024-03-05 NOTE — TELEPHONE ENCOUNTER
PRIOR AUTHORIZATION DENIED    Medication: FLUTICASONE PROPIONATE 50 MCG/ACT NA SUSP  Insurance Company: [employer] - Phone [phone] Fax [phone]  Denial Date: 3/5/2024  Denial Reason(s):     Appeal Information:     Patient Notified: Pharmacy will notify patient.

## 2024-06-15 ENCOUNTER — HEALTH MAINTENANCE LETTER (OUTPATIENT)
Age: 39
End: 2024-06-15

## 2025-06-21 ENCOUNTER — HEALTH MAINTENANCE LETTER (OUTPATIENT)
Age: 40
End: 2025-06-21

## 2025-07-16 ENCOUNTER — OFFICE VISIT (OUTPATIENT)
Dept: PODIATRY | Facility: CLINIC | Age: 40
End: 2025-07-16
Payer: COMMERCIAL

## 2025-07-16 DIAGNOSIS — M79.89 SWELLING OF RIGHT FOOT: ICD-10-CM

## 2025-07-16 DIAGNOSIS — M79.671 RIGHT FOOT PAIN: ICD-10-CM

## 2025-07-16 DIAGNOSIS — M10.071 ACUTE IDIOPATHIC GOUT OF RIGHT FOOT: Primary | ICD-10-CM

## 2025-07-16 LAB
ALBUMIN SERPL BCG-MCNC: 4.5 G/DL (ref 3.5–5.2)
ALP SERPL-CCNC: 79 U/L (ref 40–150)
ALT SERPL W P-5'-P-CCNC: 21 U/L (ref 0–70)
ANION GAP SERPL CALCULATED.3IONS-SCNC: 11 MMOL/L (ref 7–15)
AST SERPL W P-5'-P-CCNC: 21 U/L (ref 0–45)
BILIRUB SERPL-MCNC: 0.4 MG/DL
BUN SERPL-MCNC: 11.7 MG/DL (ref 6–20)
CALCIUM SERPL-MCNC: 10 MG/DL (ref 8.8–10.4)
CHLORIDE SERPL-SCNC: 104 MMOL/L (ref 98–107)
CREAT SERPL-MCNC: 0.87 MG/DL (ref 0.67–1.17)
EGFRCR SERPLBLD CKD-EPI 2021: >90 ML/MIN/1.73M2
GLUCOSE SERPL-MCNC: 105 MG/DL (ref 70–99)
HCO3 SERPL-SCNC: 26 MMOL/L (ref 22–29)
POTASSIUM SERPL-SCNC: 4.2 MMOL/L (ref 3.4–5.3)
PROT SERPL-MCNC: 7.9 G/DL (ref 6.4–8.3)
SODIUM SERPL-SCNC: 141 MMOL/L (ref 135–145)
URATE SERPL-MCNC: 8.2 MG/DL (ref 3.4–7)

## 2025-07-16 RX ADMIN — TRIAMCINOLONE ACETONIDE 20 MG: 40 INJECTION, SUSPENSION INTRA-ARTICULAR; INTRAMUSCULAR at 14:53

## 2025-07-16 NOTE — LETTER
7/16/2025      Aydin Jang  [address]  [facility] [address]      Dear Colleague,    Thank you for referring your patient, Aydin Jang, to the [facility]. Please see a copy of my visit note below.    PATIENT HISTORY:  Aydin Jang is a 39 year old male who presents to clinic for Right foot pain sudden onset 1 week ago with swelling, redness to his right 2nd toe, which is slowly improving. The pain started about 6 weeks ago and gradually progressed. He has a history of gout, last treated in office in 2023 with uric acid level of 7.5. he has had been walking in a CAM boot for pain relief. Resting, elevating, icing with pain slowly improving.     No history of autoimmune arthritis.     Review of Systems:   ROS: 10 point ROS neg other than the symptoms noted above in the HPI.     PAST MEDICAL HISTORY:   Past Medical History:   Diagnosis Date     NO ACTIVE PROBLEMS         PAST SURGICAL HISTORY:   Past Surgical History:   Procedure Laterality Date     EYE SURGERY  07/15/16    PRK both eyes     ZZC NONSPECIFIC PROCEDURE  ? 1999    L wrist cyst removal        MEDICATIONS:   Current Outpatient Medications:      acetaminophen (TYLENOL) 325 MG tablet, Take 325-650 mg by mouth every 6 hours as needed for mild pain, Disp: , Rfl:      diclofenac (VOLTAREN) 50 MG EC tablet, Take 1 tablet (50 mg) by mouth 2 times daily for 7 days (Patient not taking: Reported on 3/4/2024), Disp: 28 tablet, Rfl: 2     ALLERGIES:  No Known Allergies     SOCIAL HISTORY:   Social History     Socioeconomic History     Marital status: Single     Spouse name: Not on file     Number of children: Not on file     Years of education: Not on file     Highest education level: Not on file   Occupational History     Not on file   Tobacco Use     Smoking status: Never     Smokeless tobacco: Never   Substance and Sexual Activity     Alcohol use: Yes     Comment: 10  drinks a week      Drug use: No     Sexual activity: Never   Other Topics Concern     Parent/sibling w/ CABG, MI or angioplasty before 65F 55M? Not Asked   Social History Narrative     Not on file     Social Drivers of Health     Financial Resource Strain: Not on file   Food Insecurity: Not on file   Transportation Needs: Not on file   Physical Activity: Not on file   Stress: Not on file   Social Connections: Not on file   Interpersonal Safety: Low Risk  (3/4/2024)    Interpersonal Safety      Do you feel physically and emotionally safe where you currently live?: Yes      Within the past 12 months, have you been hit, slapped, kicked or otherwise physically hurt by someone?: No      Within the past 12 months, have you been humiliated or emotionally abused in other ways by your partner or ex-partner?: No   Housing Stability: Not on file        FAMILY HISTORY:   Family History   Problem Relation Age of Onset     Obesity Father      Gastrointestinal Disease Father      Lipids Father      Diabetes Maternal Grandmother      Hypertension Paternal Grandmother      Lipids Paternal Grandmother      Obesity Paternal Grandmother         EXAM:Vitals: There were no vitals taken for this visit.  BMI= There is no height or weight on file to calculate BMI.    General appearance: Patient is alert and fully cooperative with history & exam.  No sign of distress is noted during the visit.     Psychiatric: Affect is pleasant & appropriate.  Patient appears motivated to improve health.     Respiratory: Breathing is regular & unlabored while sitting.     HEENT: Hearing is intact to spoken word.  Speech is clear.  No gross evidence of visual impairment that would impact ambulation.    Lower Extremity Focused:    Dermatologic: Supple, no openings.      Vascular: DP pulse 2/4 right 2/4 left PT pulse 2/4 right 2/4 left.  Right 2nd metatarsal phalangeal joint edema, no varicosities noted.  CFT and skin temperature is normal to both lower  extremities.     Neurologic: Lower extremity sensation is intact to light touch.  No evidence of weakness or contracture in the lower extremities.  No evidence of neuropathy.     Musculoskeletal: Pain with minimal palpation, guarded to the right second metatarsal phalangeal joint.  No pain with palpation to the right first metatarsal phalangeal joint.  No pain along the metatarsal shafts.    Small Joint Injection/Arthrocentesis: R small MTP    Date/Time: 7/16/2025 2:53 PM    Performed by: Laquita Holden DPM  Authorized by: Laquita Holden DPM    Needle Size:  27 G  Guidance: landmark     Approach:  Dorsal  Location:  Small toe   Location comment:  Right 2nd metatarsal phalangeal joint     Site:  R small MTP                    Medications:  20 mg triamcinolone 40 MG/ML        Outcome:  Tolerated well, no immediate complications  Procedure discussed: discussed risks, benefits, and alternatives    Consent Given by:  Patient  Timeout: timeout called immediately prior to procedure    Prep: patient was prepped and draped in usual sterile fashion            Labs and Imaging: I have personally reviewed the following     Uric Acid   Date Value Ref Range Status   06/09/2023 7.5 (H) 3.4 - 7.0 mg/dL Final         ASSESSMENT:   Pain swelling right 2nd toe joint with possible gout  History of gout, elevated uric acid 7.5      Medical Decision Making/Plan:  Reviewed patient's chart in [facility].  Labs ordered: CMP, IAN, CRP, Uric acid to rule out gout vs autoimmune.   Steroid injection today to the right 2nd toe joint as noted in the procedure section  Per tolerance gradually progress out of the cam boot.  Continue with stiff soled rigid shoes.  Rest, ice, anti-inflammatories as needed for pain and/or swelling.    Briefly discussed gout and minimizing consumption of high purine rich foods such as red meats, red wine, beer, organ meats, shellfish...  I have personally reviewed prior labs including elevated uric acid from 2023  Follow-up in  4 weeks if pain persists or as needed    Patient risk factor: Moderate    All questions were answered to patients satisfaction and they will call with further questions or concerns.       Laquita Holden DPM      Again, thank you for allowing me to participate in the care of your patient.        Sincerely,        Laquita Holden DPM    Electronically signed

## 2025-07-16 NOTE — PROGRESS NOTES
PATIENT HISTORY:  Aydin Jang is a 39 year old male who presents to clinic for Right foot pain sudden onset 1 week ago with swelling, redness to his right 2nd toe, which is slowly improving. The pain started about 6 weeks ago and gradually progressed. He has a history of gout, last treated in office in 2023 with uric acid level of 7.5. he has had been walking in a CAM boot for pain relief. Resting, elevating, icing with pain slowly improving.     No history of autoimmune arthritis.     Review of Systems:   ROS: 10 point ROS neg other than the symptoms noted above in the HPI.     PAST MEDICAL HISTORY:   Past Medical History:   Diagnosis Date    NO ACTIVE PROBLEMS         PAST SURGICAL HISTORY:   Past Surgical History:   Procedure Laterality Date    EYE SURGERY  07/15/16    PRK both eyes    ZZC NONSPECIFIC PROCEDURE  ? 1999    L wrist cyst removal        MEDICATIONS:   Current Outpatient Medications:     acetaminophen (TYLENOL) 325 MG tablet, Take 325-650 mg by mouth every 6 hours as needed for mild pain, Disp: , Rfl:     diclofenac (VOLTAREN) 50 MG EC tablet, Take 1 tablet (50 mg) by mouth 2 times daily for 7 days (Patient not taking: Reported on 3/4/2024), Disp: 28 tablet, Rfl: 2     ALLERGIES:  No Known Allergies     SOCIAL HISTORY:   Social History     Socioeconomic History    Marital status: Single     Spouse name: Not on file    Number of children: Not on file    Years of education: Not on file    Highest education level: Not on file   Occupational History    Not on file   Tobacco Use    Smoking status: Never    Smokeless tobacco: Never   Substance and Sexual Activity    Alcohol use: Yes     Comment: 10 drinks a week     Drug use: No    Sexual activity: Never   Other Topics Concern    Parent/sibling w/ CABG, MI or angioplasty before 65F 55M? Not Asked   Social History Narrative    Not on file     Social Drivers of Health     Financial Resource Strain: Not on file   Food Insecurity: Not on  file   Transportation Needs: Not on file   Physical Activity: Not on file   Stress: Not on file   Social Connections: Not on file   Interpersonal Safety: Low Risk  (3/4/2024)    Interpersonal Safety     Do you feel physically and emotionally safe where you currently live?: Yes     Within the past 12 months, have you been hit, slapped, kicked or otherwise physically hurt by someone?: No     Within the past 12 months, have you been humiliated or emotionally abused in other ways by your partner or ex-partner?: No   Housing Stability: Not on file        FAMILY HISTORY:   Family History   Problem Relation Age of Onset    Obesity Father     Gastrointestinal Disease Father     Lipids Father     Diabetes Maternal Grandmother     Hypertension Paternal Grandmother     Lipids Paternal Grandmother     Obesity Paternal Grandmother         EXAM:Vitals: There were no vitals taken for this visit.  BMI= There is no height or weight on file to calculate BMI.    General appearance: Patient is alert and fully cooperative with history & exam.  No sign of distress is noted during the visit.     Psychiatric: Affect is pleasant & appropriate.  Patient appears motivated to improve health.     Respiratory: Breathing is regular & unlabored while sitting.     HEENT: Hearing is intact to spoken word.  Speech is clear.  No gross evidence of visual impairment that would impact ambulation.    Lower Extremity Focused:    Dermatologic: Supple, no openings.      Vascular: DP pulse 2/4 right 2/4 left PT pulse 2/4 right 2/4 left.  Right 2nd metatarsal phalangeal joint edema, no varicosities noted.  CFT and skin temperature is normal to both lower extremities.     Neurologic: Lower extremity sensation is intact to light touch.  No evidence of weakness or contracture in the lower extremities.  No evidence of neuropathy.     Musculoskeletal: Pain with minimal palpation, guarded to the right second metatarsal phalangeal joint.  No pain with palpation to  the right first metatarsal phalangeal joint.  No pain along the metatarsal shafts.    Small Joint Injection/Arthrocentesis: R small MTP    Date/Time: 7/16/2025 2:53 PM    Performed by: Laquita Holden DPM  Authorized by: Laquita Holden DPM    Needle Size:  27 G  Guidance: landmark     Approach:  Dorsal  Location:  Small toe   Location comment:  Right 2nd metatarsal phalangeal joint     Site:  R small MTP                    Medications:  20 mg triamcinolone 40 MG/ML        Outcome:  Tolerated well, no immediate complications  Procedure discussed: discussed risks, benefits, and alternatives    Consent Given by:  Patient  Timeout: timeout called immediately prior to procedure    Prep: patient was prepped and draped in usual sterile fashion            Labs and Imaging: I have personally reviewed the following     Uric Acid   Date Value Ref Range Status   06/09/2023 7.5 (H) 3.4 - 7.0 mg/dL Final         ASSESSMENT:   Pain swelling right 2nd toe joint with possible gout  History of gout, elevated uric acid 7.5      Medical Decision Making/Plan:  Reviewed patient's chart in [facility].  Labs ordered: CMP, IAN, CRP, Uric acid to rule out gout vs autoimmune.   Steroid injection today to the right 2nd toe joint as noted in the procedure section  Per tolerance gradually progress out of the cam boot.  Continue with stiff soled rigid shoes.  Rest, ice, anti-inflammatories as needed for pain and/or swelling.    Briefly discussed gout and minimizing consumption of high purine rich foods such as red meats, red wine, beer, organ meats, shellfish...  I have personally reviewed prior labs including elevated uric acid from 2023  Follow-up in 4 weeks if pain persists or as needed    Patient risk factor: Moderate    All questions were answered to patients satisfaction and they will call with further questions or concerns.       Laquita Holden DPM

## 2025-07-17 LAB
ANA SER QL IF: NEGATIVE
CCP AB SER IA-ACNC: 0.9 U/ML

## 2025-07-21 RX ORDER — TRIAMCINOLONE ACETONIDE 40 MG/ML
20 INJECTION, SUSPENSION INTRA-ARTICULAR; INTRAMUSCULAR
Status: COMPLETED | OUTPATIENT
Start: 2025-07-16 | End: 2025-07-16

## 2025-07-21 NOTE — PATIENT INSTRUCTIONS
Thank you for choosing [facility] Podiatry / Foot & Ankle Surgery!    DR MEMBRENO CLINIC:  [facility]   [address]   [place] [address]   (Mon, Tues)     [facility]  [address], [place] [address]  (Friday)    [facility]  [address]  [place] [address]  (Wednesdays)       TRIAGE LINE: [phone]  APPOINTMENTS: [phone]  RADIOLOGY: [phone] [id number] ([id number])  SET UP SURGERY: [phone]  PHYSICAL THERAPY: [phone]   BILLING QUESTIONS: [phone]  FAX: [phone]       Follow up: 4 weeks or as needed      GOUT  Gout is a disorder that results from the build-up of uric acid in the tissues or a joint. It most often affects the joint of the big toe.  CAUSES  Gout attacks are caused by deposits of crystallized uric acid in the joint. Uric acid is present in the blood and eliminated in the urine, but in people who have gout, uric acid accumulates and crystallizes in the joints. Uric acid is the result of the breakdown of purines, chemicals that are found naturally in our bodies and in food. Some people develop gout because their kidneys have difficulty eliminating normal amounts of uric acid, while others produce too much uric acid.  Gout occurs most commonly in the big toe because uric acid is sensitive to temperature changes. At cooler temperatures, uric acid turns into crystals. Since the toe is the part of the body that is farthest from the heart, it s also the coolest part of the body - and, thus, the most likely target of gout. However, gout can affect any joint in the body.  The tendency to accumulate uric acid is often inherited. Other factors that put a person at risk for developing gout include: high blood pressure, diabetes, obesity, surgery, chemotherapy, stress, and certain medications and vitamins. For example, the body s ability to remove uric acid can be negatively affected by  taking aspirin, some diuretic medications ( water pills ), and the vitamin niacin (also called nicotinic acid). While gout is more common in men aged 40 to 60 years, it can occur in younger men as well as in women.  Consuming foods and beverages that contain high levels of purines can trigger an attack of gout. Some foods contain more purines than others and have been associated with an increase of uric acid, which leads to gout. You may be able to reduce your chances of getting a gout attack by limiting or avoiding shellfish, organ meats (kidney, liver, etc.), red wine, beer, and red meat.  Other Triggers include but are not limited to:  Congestive heart failure, deep vein thrombosis, pneumonia, fasting, dehydration, trauma/surgery, psoriasis.  SYMTOMS  An attack of gout can be miserable, marked by the following symptoms:  Intense pain that comes on suddenly - often in the middle of the night or upon arising   Signs of inflammation such as redness, swelling, and warmth over the joint.   DIAGNOSIS  To diagnose gout, the foot and ankle surgeon will ask questions about your personal and family medical history, followed by an examination of the affected joint. Laboratory tests and x-rays are sometimes ordered to determine if the inflammation is caused by something other than gout.  TREATMENT  Initial treatment of an attack of gout typically includes the following:  Medications. Prescription medications or injections are used to treat the pain, swelling, and inflammation.   Dietary restrictions. Foods and beverages that are high in purines should be avoided, since purines are converted in the body to uric acid.   Fluids. Drink plenty of water and other fluids each day, while also avoiding alcoholic beverages, which cause dehydration. Cherry Juice works well to help decrease pain.  Immobilize and elevate the foot. Avoid standing and walking to give your foot a rest. Also, elevate your foot (level with or slightly above  the heart) to help reduce swelling.   The symptoms of gout and the inflammatory process usually resolve in three to ten days with treatment. If gout symptoms continue despite the initial treatment, or if repeated attacks occur, see your primary care physician for maintenance treatment that may involve daily medication. In cases of repeated episodes, the underlying problem must be addressed, as the build-up of uric acid over time can cause arthritic damage to the joint.  DIET CHANGE  A gout diet reduces your intake of foods that are high in purines, which helps control your body's production of uric acid. If you're overweight or obese, lose weight. However, avoid fasting and rapid weight loss because these can promote a gout attack. Drink plenty of fluids to help flush uric acid from your body. Also avoid high-protein diets, which can cause you to produce too much uric acid (hyperuricemia).   To follow the diet:   Limit meat, poultry and fish. Animal proteins are high in purine. Avoid or severely limit high-purine foods, such as organ meats, herring, anchovies and mackerel. Red meat (beef, pork and lamb), fatty fish and seafood (tuna, shrimp, lobster and scallops) are associated with increased risk of gout. Because all meat, poultry and fish contain purines, limit your intake to 4 to 6 ounces (113 to 170 grams) daily.   Eat more plant-based proteins. You can increase your protein by including more plant-based sources, such as beans and legumes. This switch will also help you cut down on saturated fats, which may indirectly contribute to obesity and gout.   Limit or avoid alcohol. Alcohol interferes with the elimination of uric acid from your body. Drinking beer, in particular, has been linked to gout attacks. If you're having an attack, avoid alcohol. However, when you're not having an attack, drinking one or two 5-ounce (148 milliliter) servings a day of wine is not likely to increase your risk.   Drink plenty of  fluids, particularly water. Fluids can help remove uric acid from your body. Aim for eight to 16 8-ounce (237 milliliter) glasses a day.   Choose low-fat or fat-free dairy products. Some studies have shown that drinking skim or low-fat milk and eating foods made with them, such as yogurt, help reduce the risk of gout. Aim for adequate dairy intake of 16 to 24 fluid ounces (473 to 710 milliliters) daily.   Choose complex carbohydrates. Eat more whole grains and fruits and vegetables and fewer refined carbohydrates, such as white bread, cakes and candy.   Limit or avoid sugar. Too many sweets can leave you with no room for plant-based proteins and low-fat or fat-free dairy products -- the foods you need to avoid gout. Sugary foods also tend to be high in calories, so they make it easier to eat more than you're likely to burn off. Although there's debate about whether sugar has a direct effect on uric acid levels, sweets are definitely linked to overweight and obesity.   There's also some evidence that drinking four to six cups of coffee a day lowers gout risk in men.   RESULTS  Following a gout diet can help you limit your body's uric acid production and increase its elimination. It's not likely to lower the uric acid concentration in your blood enough to treat your gout without medication, but it may help decrease the number of attacks and limit their severity. Following the gout diet and limiting your calories -- particularly if you also add in moderate daily exercise, such as brisk walking -- also can improve your overall health by helping you achieve and maintain a healthy weight.

## 2025-07-23 ENCOUNTER — MYC MEDICAL ADVICE (OUTPATIENT)
Dept: PODIATRY | Facility: CLINIC | Age: 40
End: 2025-07-23
Payer: COMMERCIAL

## 2025-07-23 DIAGNOSIS — M79.671 RIGHT FOOT PAIN: ICD-10-CM

## 2025-07-23 DIAGNOSIS — M10.071 ACUTE IDIOPATHIC GOUT OF RIGHT FOOT: Primary | ICD-10-CM

## 2025-07-23 RX ORDER — METHYLPREDNISOLONE 4 MG/1
TABLET ORAL
Qty: 21 TABLET | Refills: 0 | Status: SHIPPED | OUTPATIENT
Start: 2025-07-23

## 2025-07-23 NOTE — TELEPHONE ENCOUNTER
Please see Mychart update and advise if patient needs to be re-evaluated in clinic.     JACKIE Mckeon RN

## 2025-08-24 SDOH — HEALTH STABILITY: PHYSICAL HEALTH: ON AVERAGE, HOW MANY DAYS PER WEEK DO YOU ENGAGE IN MODERATE TO STRENUOUS EXERCISE (LIKE A BRISK WALK)?: 0 DAYS

## 2025-08-24 SDOH — HEALTH STABILITY: PHYSICAL HEALTH: ON AVERAGE, HOW MANY MINUTES DO YOU ENGAGE IN EXERCISE AT THIS LEVEL?: 0 MIN

## 2025-08-25 ENCOUNTER — OFFICE VISIT (OUTPATIENT)
Dept: INTERNAL MEDICINE | Facility: CLINIC | Age: 40
End: 2025-08-25
Payer: COMMERCIAL

## 2025-08-25 VITALS
HEIGHT: 70 IN | RESPIRATION RATE: 9 BRPM | WEIGHT: 209 LBS | SYSTOLIC BLOOD PRESSURE: 142 MMHG | BODY MASS INDEX: 29.92 KG/M2 | TEMPERATURE: 96.9 F | DIASTOLIC BLOOD PRESSURE: 87 MMHG | OXYGEN SATURATION: 100 %

## 2025-08-25 DIAGNOSIS — Z00.00 ROUTINE GENERAL MEDICAL EXAMINATION AT A HEALTH CARE FACILITY: Primary | ICD-10-CM

## 2025-08-25 PROBLEM — M1A.9XX0 CHRONIC GOUT WITHOUT TOPHUS, UNSPECIFIED CAUSE, UNSPECIFIED SITE: Status: ACTIVE | Noted: 2025-08-25

## 2025-08-25 PROBLEM — Z78.9 PESCETARIAN: Status: ACTIVE | Noted: 2025-08-25

## 2025-08-25 PROBLEM — E79.0 HYPERURICEMIA: Status: ACTIVE | Noted: 2025-08-25

## 2025-08-25 LAB
BASOPHILS # BLD AUTO: <0.04 10E3/UL (ref 0–0.2)
BASOPHILS NFR BLD AUTO: 0.7 %
CHOLEST SERPL-MCNC: 180 MG/DL
EOSINOPHIL # BLD AUTO: 0.06 10E3/UL (ref 0–0.7)
EOSINOPHIL NFR BLD AUTO: 2.2 %
ERYTHROCYTE [DISTWIDTH] IN BLOOD BY AUTOMATED COUNT: 12.6 % (ref 10–15)
FASTING STATUS PATIENT QL REPORTED: NO
HCT VFR BLD AUTO: 43.1 % (ref 40–53)
HDLC SERPL-MCNC: 42 MG/DL
HGB BLD-MCNC: 14.5 G/DL (ref 13.3–17.7)
HIV 1+2 AB+HIV1 P24 AG SERPL QL IA: NONREACTIVE
IMM GRANULOCYTES # BLD: <0.04 10E3/UL
IMM GRANULOCYTES NFR BLD: 0 %
LDLC SERPL CALC-MCNC: 102 MG/DL
LYMPHOCYTES # BLD AUTO: 0.77 10E3/UL (ref 0.8–5.3)
LYMPHOCYTES NFR BLD AUTO: 28.2 %
MCH RBC QN AUTO: 28.3 PG (ref 26.5–33)
MCHC RBC AUTO-ENTMCNC: 33.6 G/DL (ref 31.5–36.5)
MCV RBC AUTO: 84.2 FL (ref 78–100)
MONOCYTES # BLD AUTO: 0.27 10E3/UL (ref 0–1.3)
MONOCYTES NFR BLD AUTO: 9.9 %
NEUTROPHILS # BLD AUTO: 1.61 10E3/UL (ref 1.6–8.3)
NEUTROPHILS NFR BLD AUTO: 59 %
NONHDLC SERPL-MCNC: 138 MG/DL
PLATELET # BLD AUTO: 244 10E3/UL (ref 150–450)
PSA SERPL DL<=0.01 NG/ML-MCNC: 1.63 NG/ML (ref 0–2.5)
RBC # BLD AUTO: 5.12 10E6/UL (ref 4.4–5.9)
T PALLIDUM AB SER QL: NONREACTIVE
TRIGL SERPL-MCNC: 181 MG/DL
VIT B12 SERPL-MCNC: 259 PG/ML (ref 232–1245)
VIT D+METAB SERPL-MCNC: 19 NG/ML (ref 20–50)
WBC # BLD AUTO: 2.73 10E3/UL (ref 4–11)

## 2025-08-25 PROCEDURE — 87591 N.GONORRHOEAE DNA AMP PROB: CPT | Performed by: STUDENT IN AN ORGANIZED HEALTH CARE EDUCATION/TRAINING PROGRAM

## 2025-08-25 PROCEDURE — 99396 PREV VISIT EST AGE 40-64: CPT | Performed by: STUDENT IN AN ORGANIZED HEALTH CARE EDUCATION/TRAINING PROGRAM

## 2025-08-25 PROCEDURE — 87491 CHLMYD TRACH DNA AMP PROBE: CPT | Performed by: STUDENT IN AN ORGANIZED HEALTH CARE EDUCATION/TRAINING PROGRAM

## 2025-08-25 PROCEDURE — 82306 VITAMIN D 25 HYDROXY: CPT | Performed by: STUDENT IN AN ORGANIZED HEALTH CARE EDUCATION/TRAINING PROGRAM

## 2025-08-25 PROCEDURE — 82607 VITAMIN B-12: CPT | Performed by: STUDENT IN AN ORGANIZED HEALTH CARE EDUCATION/TRAINING PROGRAM

## 2025-08-25 PROCEDURE — 3077F SYST BP >= 140 MM HG: CPT | Performed by: STUDENT IN AN ORGANIZED HEALTH CARE EDUCATION/TRAINING PROGRAM

## 2025-08-25 PROCEDURE — 86780 TREPONEMA PALLIDUM: CPT | Performed by: STUDENT IN AN ORGANIZED HEALTH CARE EDUCATION/TRAINING PROGRAM

## 2025-08-25 PROCEDURE — 36415 COLL VENOUS BLD VENIPUNCTURE: CPT | Performed by: STUDENT IN AN ORGANIZED HEALTH CARE EDUCATION/TRAINING PROGRAM

## 2025-08-25 PROCEDURE — 85025 COMPLETE CBC W/AUTO DIFF WBC: CPT | Performed by: STUDENT IN AN ORGANIZED HEALTH CARE EDUCATION/TRAINING PROGRAM

## 2025-08-25 PROCEDURE — 3079F DIAST BP 80-89 MM HG: CPT | Performed by: STUDENT IN AN ORGANIZED HEALTH CARE EDUCATION/TRAINING PROGRAM

## 2025-08-25 PROCEDURE — G0103 PSA SCREENING: HCPCS | Performed by: STUDENT IN AN ORGANIZED HEALTH CARE EDUCATION/TRAINING PROGRAM

## 2025-08-25 PROCEDURE — 1125F AMNT PAIN NOTED PAIN PRSNT: CPT | Performed by: STUDENT IN AN ORGANIZED HEALTH CARE EDUCATION/TRAINING PROGRAM

## 2025-08-25 PROCEDURE — 80061 LIPID PANEL: CPT | Performed by: STUDENT IN AN ORGANIZED HEALTH CARE EDUCATION/TRAINING PROGRAM

## 2025-08-25 PROCEDURE — 87389 HIV-1 AG W/HIV-1&-2 AB AG IA: CPT | Performed by: STUDENT IN AN ORGANIZED HEALTH CARE EDUCATION/TRAINING PROGRAM

## 2025-08-25 ASSESSMENT — PAIN SCALES - GENERAL: PAINLEVEL_OUTOF10: MILD PAIN (1)

## 2025-08-26 LAB
C TRACH DNA SPEC QL PROBE+SIG AMP: NEGATIVE
N GONORRHOEA DNA SPEC QL NAA+PROBE: NEGATIVE
SPECIMEN TYPE: NORMAL

## 2025-08-28 ENCOUNTER — PATIENT OUTREACH (OUTPATIENT)
Dept: CARE COORDINATION | Facility: CLINIC | Age: 40
End: 2025-08-28
Payer: COMMERCIAL

## 2025-09-04 ENCOUNTER — OFFICE VISIT (OUTPATIENT)
Dept: INTERNAL MEDICINE | Facility: CLINIC | Age: 40
End: 2025-09-04
Payer: COMMERCIAL

## 2025-09-04 VITALS
SYSTOLIC BLOOD PRESSURE: 132 MMHG | HEIGHT: 70 IN | OXYGEN SATURATION: 100 % | RESPIRATION RATE: 14 BRPM | WEIGHT: 209 LBS | TEMPERATURE: 97.6 F | HEART RATE: 12 BPM | DIASTOLIC BLOOD PRESSURE: 89 MMHG | BODY MASS INDEX: 29.92 KG/M2

## 2025-09-04 DIAGNOSIS — E55.9 VITAMIN D DEFICIENCY: ICD-10-CM

## 2025-09-04 DIAGNOSIS — E78.2 MIXED HYPERLIPIDEMIA: ICD-10-CM

## 2025-09-04 DIAGNOSIS — M1A.0710 IDIOPATHIC CHRONIC GOUT OF RIGHT FOOT WITHOUT TOPHUS: Primary | ICD-10-CM

## 2025-09-04 DIAGNOSIS — R35.1 NOCTURIA: ICD-10-CM

## 2025-09-04 DIAGNOSIS — Z23 NEED FOR VACCINATION: ICD-10-CM

## 2025-09-04 DIAGNOSIS — E53.8 LOW SERUM VITAMIN B12: ICD-10-CM

## 2025-09-04 DIAGNOSIS — M1A.9XX0 CHRONIC GOUT WITHOUT TOPHUS, UNSPECIFIED CAUSE, UNSPECIFIED SITE: ICD-10-CM

## 2025-09-04 DIAGNOSIS — D72.810 LYMPHOPENIA: ICD-10-CM

## 2025-09-04 LAB
BASOPHILS # BLD AUTO: <0.04 10E3/UL (ref 0–0.2)
BASOPHILS NFR BLD AUTO: 0.7 %
EOSINOPHIL # BLD AUTO: 0.04 10E3/UL (ref 0–0.7)
EOSINOPHIL NFR BLD AUTO: 1.4 %
ERYTHROCYTE [DISTWIDTH] IN BLOOD BY AUTOMATED COUNT: 12.9 % (ref 10–15)
FOLATE SERPL-MCNC: 10.3 NG/ML (ref 4.6–34.8)
HCT VFR BLD AUTO: 44.7 % (ref 40–53)
HGB BLD-MCNC: 15 G/DL (ref 13.3–17.7)
IMM GRANULOCYTES # BLD: <0.04 10E3/UL
IMM GRANULOCYTES NFR BLD: 0 %
LYMPHOCYTES # BLD AUTO: 0.73 10E3/UL (ref 0.8–5.3)
LYMPHOCYTES NFR BLD AUTO: 25.6 %
MCH RBC QN AUTO: 28.2 PG (ref 26.5–33)
MCHC RBC AUTO-ENTMCNC: 33.6 G/DL (ref 31.5–36.5)
MCV RBC AUTO: 84.2 FL (ref 78–100)
MONOCYTES # BLD AUTO: 0.23 10E3/UL (ref 0–1.3)
MONOCYTES NFR BLD AUTO: 8.1 %
NEUTROPHILS # BLD AUTO: 1.83 10E3/UL (ref 1.6–8.3)
NEUTROPHILS NFR BLD AUTO: 64.2 %
PLATELET # BLD AUTO: 238 10E3/UL (ref 150–450)
RBC # BLD AUTO: 5.31 10E6/UL (ref 4.4–5.9)
RETICS # AUTO: 0.06 10E6/UL (ref 0.03–0.1)
RETICS/RBC NFR AUTO: 1.11 % (ref 0.5–2)
WBC # BLD AUTO: 2.85 10E3/UL (ref 4–11)

## 2025-09-04 RX ORDER — COLCHICINE 0.6 MG/1
TABLET ORAL
Qty: 10 TABLET | Refills: 2 | Status: SHIPPED | OUTPATIENT
Start: 2025-09-04

## 2025-09-04 RX ORDER — ALLOPURINOL 100 MG/1
100 TABLET ORAL DAILY
Qty: 90 TABLET | OUTPATIENT
Start: 2025-09-04

## 2025-09-04 RX ORDER — ALLOPURINOL 100 MG/1
100 TABLET ORAL DAILY
Qty: 30 TABLET | Refills: 1 | Status: SHIPPED | OUTPATIENT
Start: 2025-09-04

## 2025-09-04 RX ORDER — ALLOPURINOL 100 MG/1
100 TABLET ORAL DAILY
Qty: 30 TABLET | Refills: 1 | Status: CANCELLED | OUTPATIENT
Start: 2025-09-04

## 2025-09-04 RX ORDER — ERGOCALCIFEROL 1.25 MG/1
50000 CAPSULE, LIQUID FILLED ORAL WEEKLY
Qty: 8 CAPSULE | Refills: 0 | Status: CANCELLED | OUTPATIENT
Start: 2025-09-04 | End: 2025-10-24

## 2025-09-04 ASSESSMENT — PAIN SCALES - GENERAL: PAINLEVEL_OUTOF10: MILD PAIN (1)
